# Patient Record
Sex: FEMALE | Race: WHITE | Employment: UNEMPLOYED | URBAN - METROPOLITAN AREA
[De-identification: names, ages, dates, MRNs, and addresses within clinical notes are randomized per-mention and may not be internally consistent; named-entity substitution may affect disease eponyms.]

---

## 2019-07-23 ENCOUNTER — HOSPITAL ENCOUNTER (OUTPATIENT)
Dept: ULTRASOUND IMAGING | Age: 34
Discharge: HOME | End: 2019-07-23
Payer: COMMERCIAL

## 2019-07-23 ENCOUNTER — HOSPITAL ENCOUNTER (OUTPATIENT)
Age: 34
Discharge: HOME | End: 2019-07-23
Payer: COMMERCIAL

## 2019-07-23 DIAGNOSIS — R10.2 PELVIC PAIN: ICD-10-CM

## 2019-07-23 DIAGNOSIS — R19.00 PELVIC MASS: ICD-10-CM

## 2019-07-23 PROCEDURE — 76830 TRANSVAGINAL US NON-OB: CPT

## 2019-07-23 PROCEDURE — 76856 US EXAM PELVIC COMPLETE: CPT

## 2019-08-05 ENCOUNTER — HOSPITAL ENCOUNTER (OUTPATIENT)
Age: 34
End: 2019-08-05
Payer: MEDICAID

## 2019-08-05 ENCOUNTER — HOSPITAL ENCOUNTER (OUTPATIENT)
Dept: CT IMAGING | Age: 34
Discharge: HOME | End: 2019-08-05
Payer: MEDICAID

## 2019-08-05 DIAGNOSIS — R19.07 GENERALIZED SWELLING, MASS, OR LUMP OF ABDOMEN OR PELVIS: ICD-10-CM

## 2019-08-05 PROCEDURE — 74177 CT ABD & PELVIS W/CONTRAST: CPT

## 2019-08-05 PROCEDURE — 6360000004 HC RX CONTRAST MEDICATION: Performed by: RADIOLOGY

## 2019-08-05 RX ADMIN — IOPAMIDOL 80 ML: 755 INJECTION, SOLUTION INTRAVENOUS at 09:51

## 2019-08-30 ENCOUNTER — HOSPITAL ENCOUNTER (OUTPATIENT)
Age: 34
Discharge: HOME | End: 2019-08-30
Payer: MEDICAID

## 2019-08-30 LAB
ALBUMIN SERPL-MCNC: 4.9 G/DL (ref 3.5–5.2)
ALP BLD-CCNC: 70 U/L (ref 35–104)
ALT SERPL-CCNC: 78 U/L (ref 0–32)
ANION GAP SERPL CALCULATED.3IONS-SCNC: 10 MMOL/L (ref 7–16)
AST SERPL-CCNC: 39 U/L (ref 0–31)
BILIRUB SERPL-MCNC: <0.2 MG/DL (ref 0–1.2)
BILIRUBIN DIRECT: <0.2 MG/DL (ref 0–0.3)
BILIRUBIN, INDIRECT: ABNORMAL MG/DL (ref 0–1)
BUN BLDV-MCNC: 12 MG/DL (ref 6–20)
CALCIUM SERPL-MCNC: 10 MG/DL (ref 8.6–10.2)
CHLORIDE BLD-SCNC: 103 MMOL/L (ref 98–107)
CO2: 27 MMOL/L (ref 22–29)
CREAT SERPL-MCNC: 0.6 MG/DL (ref 0.5–1)
GFR AFRICAN AMERICAN: >60
GFR NON-AFRICAN AMERICAN: >60 ML/MIN/1.73
GLUCOSE BLD-MCNC: 90 MG/DL (ref 74–99)
HCT VFR BLD CALC: 41.7 % (ref 34–48)
HEMOGLOBIN: 13.7 G/DL (ref 11.5–15.5)
MCH RBC QN AUTO: 29.2 PG (ref 26–35)
MCHC RBC AUTO-ENTMCNC: 32.9 % (ref 32–34.5)
MCV RBC AUTO: 88.9 FL (ref 80–99.9)
PDW BLD-RTO: 14.3 FL (ref 11.5–15)
PLATELET # BLD: 249 E9/L (ref 130–450)
PMV BLD AUTO: 10.7 FL (ref 7–12)
POTASSIUM SERPL-SCNC: 3.9 MMOL/L (ref 3.5–5)
RBC # BLD: 4.69 E12/L (ref 3.5–5.5)
SODIUM BLD-SCNC: 140 MMOL/L (ref 132–146)
TOTAL PROTEIN: 7.9 G/DL (ref 6.4–8.3)
WBC # BLD: 7.8 E9/L (ref 4.5–11.5)

## 2019-08-30 PROCEDURE — 80074 ACUTE HEPATITIS PANEL: CPT

## 2019-08-30 PROCEDURE — 80048 BASIC METABOLIC PNL TOTAL CA: CPT

## 2019-08-30 PROCEDURE — 80076 HEPATIC FUNCTION PANEL: CPT

## 2019-08-30 PROCEDURE — 81596 NFCT DS CHRNC HCV 6 ASSAYS: CPT

## 2019-08-30 PROCEDURE — 36415 COLL VENOUS BLD VENIPUNCTURE: CPT

## 2019-08-30 PROCEDURE — 87902 NFCT AGT GNTYP ALYS HEP C: CPT

## 2019-08-30 PROCEDURE — 82105 ALPHA-FETOPROTEIN SERUM: CPT

## 2019-08-30 PROCEDURE — 87522 HEPATITIS C REVRS TRNSCRPJ: CPT

## 2019-08-30 PROCEDURE — 85027 COMPLETE CBC AUTOMATED: CPT

## 2019-08-31 ENCOUNTER — HOSPITAL ENCOUNTER (OUTPATIENT)
Dept: ULTRASOUND IMAGING | Age: 34
Discharge: HOME | End: 2019-08-31
Payer: MEDICAID

## 2019-08-31 DIAGNOSIS — R10.11 ABDOMINAL PAIN, RIGHT UPPER QUADRANT: ICD-10-CM

## 2019-08-31 PROCEDURE — 76705 ECHO EXAM OF ABDOMEN: CPT

## 2019-09-02 LAB
AFP-TUMOR MARKER: 3 NG/ML (ref 0–9)
HCV QNT BY NAAT IU/ML: ABNORMAL IU/ML
HCV QNT BY NAAT LOG IU/ML: 6.14 LOG IU/ML
INTERPRETATION: DETECTED

## 2019-09-03 LAB
HAV IGM SER IA-ACNC: ABNORMAL
HEPATITIS B CORE IGM ANTIBODY: ABNORMAL
HEPATITIS B SURFACE ANTIGEN INTERPRETATION: ABNORMAL
HEPATITIS C ANTIBODY INTERPRETATION: REACTIVE

## 2019-09-05 LAB — HEPATITIS C GENOTYPE: NORMAL

## 2019-09-09 LAB
Lab: NORMAL
REPORT: NORMAL
THIS TEST SENT TO: NORMAL

## 2019-09-11 RX ORDER — SODIUM CHLORIDE 0.9 % (FLUSH) 0.9 %
10 SYRINGE (ML) INJECTION PRN
Status: CANCELLED | OUTPATIENT
Start: 2019-09-11

## 2019-09-11 RX ORDER — SODIUM CHLORIDE, SODIUM LACTATE, POTASSIUM CHLORIDE, CALCIUM CHLORIDE 600; 310; 30; 20 MG/100ML; MG/100ML; MG/100ML; MG/100ML
INJECTION, SOLUTION INTRAVENOUS CONTINUOUS
Status: CANCELLED | OUTPATIENT
Start: 2019-09-11

## 2019-09-11 RX ORDER — SODIUM CHLORIDE 0.9 % (FLUSH) 0.9 %
10 SYRINGE (ML) INJECTION EVERY 12 HOURS SCHEDULED
Status: CANCELLED | OUTPATIENT
Start: 2019-09-11

## 2019-09-12 ENCOUNTER — HOSPITAL ENCOUNTER (OUTPATIENT)
Dept: PREADMISSION TESTING | Age: 34
Discharge: HOME | End: 2019-09-12
Payer: MEDICAID

## 2019-09-12 VITALS
RESPIRATION RATE: 16 BRPM | OXYGEN SATURATION: 96 % | WEIGHT: 134.44 LBS | HEART RATE: 76 BPM | HEIGHT: 59 IN | BODY MASS INDEX: 27.1 KG/M2 | SYSTOLIC BLOOD PRESSURE: 105 MMHG | DIASTOLIC BLOOD PRESSURE: 61 MMHG | TEMPERATURE: 98.2 F

## 2019-09-12 DIAGNOSIS — Z01.818 PRE-OP TESTING: Primary | ICD-10-CM

## 2019-09-12 LAB
ABO/RH: NORMAL
ALBUMIN SERPL-MCNC: 4.7 G/DL (ref 3.5–5.2)
ALP BLD-CCNC: 72 U/L (ref 35–104)
ALT SERPL-CCNC: 107 U/L (ref 0–32)
ANION GAP SERPL CALCULATED.3IONS-SCNC: 11 MMOL/L (ref 7–16)
ANTIBODY SCREEN: NORMAL
AST SERPL-CCNC: 56 U/L (ref 0–31)
BILIRUB SERPL-MCNC: <0.2 MG/DL (ref 0–1.2)
BUN BLDV-MCNC: 9 MG/DL (ref 6–20)
CALCIUM SERPL-MCNC: 9.4 MG/DL (ref 8.6–10.2)
CHLORIDE BLD-SCNC: 99 MMOL/L (ref 98–107)
CO2: 28 MMOL/L (ref 22–29)
CREAT SERPL-MCNC: 0.5 MG/DL (ref 0.5–1)
GFR AFRICAN AMERICAN: >60
GFR NON-AFRICAN AMERICAN: >60 ML/MIN/1.73
GLUCOSE BLD-MCNC: 98 MG/DL (ref 74–99)
HCT VFR BLD CALC: 40.4 % (ref 34–48)
HEMOGLOBIN: 13.5 G/DL (ref 11.5–15.5)
MCH RBC QN AUTO: 29.5 PG (ref 26–35)
MCHC RBC AUTO-ENTMCNC: 33.4 % (ref 32–34.5)
MCV RBC AUTO: 88.2 FL (ref 80–99.9)
PDW BLD-RTO: 13.8 FL (ref 11.5–15)
PLATELET # BLD: 254 E9/L (ref 130–450)
PMV BLD AUTO: 10.6 FL (ref 7–12)
POTASSIUM REFLEX MAGNESIUM: 3.9 MMOL/L (ref 3.5–5)
RBC # BLD: 4.58 E12/L (ref 3.5–5.5)
SODIUM BLD-SCNC: 138 MMOL/L (ref 132–146)
TOTAL PROTEIN: 7.6 G/DL (ref 6.4–8.3)
WBC # BLD: 8.7 E9/L (ref 4.5–11.5)

## 2019-09-12 PROCEDURE — 36415 COLL VENOUS BLD VENIPUNCTURE: CPT

## 2019-09-12 PROCEDURE — 86900 BLOOD TYPING SEROLOGIC ABO: CPT

## 2019-09-12 PROCEDURE — 86901 BLOOD TYPING SEROLOGIC RH(D): CPT

## 2019-09-12 PROCEDURE — 86850 RBC ANTIBODY SCREEN: CPT

## 2019-09-12 PROCEDURE — 85027 COMPLETE CBC AUTOMATED: CPT

## 2019-09-12 PROCEDURE — 80053 COMPREHEN METABOLIC PANEL: CPT

## 2019-09-12 RX ORDER — ARIPIPRAZOLE 10 MG/1
10 TABLET ORAL DAILY
COMMUNITY
End: 2022-01-31 | Stop reason: ALTCHOICE

## 2019-09-12 ASSESSMENT — PAIN SCALES - GENERAL: PAINLEVEL_OUTOF10: 4

## 2019-09-12 ASSESSMENT — PAIN DESCRIPTION - LOCATION: LOCATION: ABDOMEN

## 2019-09-12 ASSESSMENT — PAIN DESCRIPTION - PAIN TYPE: TYPE: CHRONIC PAIN

## 2019-09-12 ASSESSMENT — PAIN DESCRIPTION - DESCRIPTORS: DESCRIPTORS: ACHING;SORE

## 2019-09-12 ASSESSMENT — PAIN DESCRIPTION - FREQUENCY: FREQUENCY: INTERMITTENT

## 2019-09-12 ASSESSMENT — PAIN DESCRIPTION - ORIENTATION: ORIENTATION: RIGHT;MID

## 2019-09-12 NOTE — PROGRESS NOTES
polish on your fingers or toes. 11. DO NOT wear any jewelry or piercings on day of surgery. All body piercing jewelry must be removed. 12. Shower the night before surgery with _x__Antibacterial soap     13. TOTAL JOINT REPLACEMENT/HYSTERECTOMY PATIENTS ONLY---Remember to bring Blood Bank bracelet to the hospital on the day of surgery. 14. If you have a Living Will and Durable Power of  for Healthcare, please bring in a copy. 15. If appropriate bring crutches, inspirex, WALKER, CANE etc... 12. Notify your Surgeon if you develop any illness between now and surgery time, cough, cold, fever, sore throat, nausea, vomiting, etc.  Please notify your surgeon if you experience dizziness, shortness of breath or blurred vision between now & the time of your surgery. 17. If you have ___dentures, they will be removed before going to the OR; we will provide you a container. If you wear ___contact lenses or ___glasses, they will be removed; please bring a case for them. 18. To provide excellent care visitors will be limited to 2 in the room at any given time. 19. Please bring picture ID and insurance card. 20. Sleep apnea patients need to bring CPAP AND SETTINGS to hospital on day of surgery. 21. During flu season no children under the age of 15 are permitted in the hospital for the safety of all patients. 22. Other Please check in at the main lobby/information desk in the front of the hospital.               Please call AMBULATORY CARE if you have any further questions.    [address] [facility]     [address]

## 2019-09-23 ENCOUNTER — ANESTHESIA EVENT (OUTPATIENT)
Dept: OPERATING ROOM | Age: 34
End: 2019-09-23
Payer: MEDICAID

## 2019-09-23 ENCOUNTER — ANESTHESIA (OUTPATIENT)
Dept: OPERATING ROOM | Age: 34
End: 2019-09-23
Payer: MEDICAID

## 2019-09-23 ENCOUNTER — HOSPITAL ENCOUNTER (OUTPATIENT)
Age: 34
Setting detail: OBSERVATION
Discharge: HOME | End: 2019-09-24
Attending: OBSTETRICS & GYNECOLOGY | Admitting: OBSTETRICS & GYNECOLOGY
Payer: MEDICAID

## 2019-09-23 VITALS
SYSTOLIC BLOOD PRESSURE: 111 MMHG | TEMPERATURE: 97.7 F | OXYGEN SATURATION: 100 % | DIASTOLIC BLOOD PRESSURE: 54 MMHG | RESPIRATION RATE: 16 BRPM

## 2019-09-23 DIAGNOSIS — Z01.818 PREOP TESTING: Primary | ICD-10-CM

## 2019-09-23 PROBLEM — D25.9 FIBROID UTERUS: Status: ACTIVE | Noted: 2019-09-23

## 2019-09-23 LAB
AMPHETAMINE SCREEN, URINE: NOT DETECTED
BARBITURATE SCREEN URINE: NOT DETECTED
BENZODIAZEPINE SCREEN, URINE: NOT DETECTED
CANNABINOID SCREEN URINE: NOT DETECTED
COCAINE METABOLITE SCREEN URINE: NOT DETECTED
HCG(URINE) PREGNANCY TEST: NEGATIVE
Lab: NORMAL
METHADONE SCREEN, URINE: NOT DETECTED
OPIATE SCREEN URINE: NOT DETECTED
PHENCYCLIDINE SCREEN URINE: NOT DETECTED
PROPOXYPHENE SCREEN: NOT DETECTED

## 2019-09-23 PROCEDURE — 3600000009 HC SURGERY ROBOT BASE: Performed by: OBSTETRICS & GYNECOLOGY

## 2019-09-23 PROCEDURE — 7100000000 HC PACU RECOVERY - FIRST 15 MIN: Performed by: OBSTETRICS & GYNECOLOGY

## 2019-09-23 PROCEDURE — 3700000000 HC ANESTHESIA ATTENDED CARE: Performed by: OBSTETRICS & GYNECOLOGY

## 2019-09-23 PROCEDURE — 2500000003 HC RX 250 WO HCPCS: Performed by: ANESTHESIOLOGY

## 2019-09-23 PROCEDURE — 3700000001 HC ADD 15 MINUTES (ANESTHESIA): Performed by: OBSTETRICS & GYNECOLOGY

## 2019-09-23 PROCEDURE — 88307 TISSUE EXAM BY PATHOLOGIST: CPT

## 2019-09-23 PROCEDURE — 6360000002 HC RX W HCPCS

## 2019-09-23 PROCEDURE — G0378 HOSPITAL OBSERVATION PER HR: HCPCS

## 2019-09-23 PROCEDURE — C1760 CLOSURE DEV, VASC: HCPCS | Performed by: OBSTETRICS & GYNECOLOGY

## 2019-09-23 PROCEDURE — 3600000019 HC SURGERY ROBOT ADDTL 15MIN: Performed by: OBSTETRICS & GYNECOLOGY

## 2019-09-23 PROCEDURE — 2580000003 HC RX 258: Performed by: OBSTETRICS & GYNECOLOGY

## 2019-09-23 PROCEDURE — 6370000000 HC RX 637 (ALT 250 FOR IP): Performed by: OBSTETRICS & GYNECOLOGY

## 2019-09-23 PROCEDURE — 58542 LSH W/T/O UT 250 G OR LESS: CPT | Performed by: OBSTETRICS & GYNECOLOGY

## 2019-09-23 PROCEDURE — 2709999900 HC NON-CHARGEABLE SUPPLY: Performed by: OBSTETRICS & GYNECOLOGY

## 2019-09-23 PROCEDURE — 6360000002 HC RX W HCPCS: Performed by: ANESTHESIOLOGY

## 2019-09-23 PROCEDURE — 2500000003 HC RX 250 WO HCPCS

## 2019-09-23 PROCEDURE — 2580000003 HC RX 258

## 2019-09-23 PROCEDURE — 80307 DRUG TEST PRSMV CHEM ANLYZR: CPT

## 2019-09-23 PROCEDURE — 6360000002 HC RX W HCPCS: Performed by: OBSTETRICS & GYNECOLOGY

## 2019-09-23 PROCEDURE — S2900 ROBOTIC SURGICAL SYSTEM: HCPCS | Performed by: OBSTETRICS & GYNECOLOGY

## 2019-09-23 PROCEDURE — 7100000001 HC PACU RECOVERY - ADDTL 15 MIN: Performed by: OBSTETRICS & GYNECOLOGY

## 2019-09-23 PROCEDURE — 2580000003 HC RX 258: Performed by: ANESTHESIOLOGY

## 2019-09-23 PROCEDURE — 81025 URINE PREGNANCY TEST: CPT

## 2019-09-23 RX ORDER — SODIUM CHLORIDE, SODIUM LACTATE, POTASSIUM CHLORIDE, CALCIUM CHLORIDE 600; 310; 30; 20 MG/100ML; MG/100ML; MG/100ML; MG/100ML
INJECTION, SOLUTION INTRAVENOUS CONTINUOUS PRN
Status: DISCONTINUED | OUTPATIENT
Start: 2019-09-23 | End: 2019-09-23 | Stop reason: SDUPTHER

## 2019-09-23 RX ORDER — CEFOXITIN SODIUM 2 G/50ML
2 INJECTION, SOLUTION INTRAVENOUS EVERY 6 HOURS SCHEDULED
Status: DISCONTINUED | OUTPATIENT
Start: 2019-09-23 | End: 2019-09-23

## 2019-09-23 RX ORDER — ACETAMINOPHEN 325 MG/1
650 TABLET ORAL EVERY 4 HOURS PRN
Status: DISCONTINUED | OUTPATIENT
Start: 2019-09-23 | End: 2019-09-24 | Stop reason: HOSPADM

## 2019-09-23 RX ORDER — SUCRALFATE 1 G/1
1 TABLET ORAL EVERY 12 HOURS SCHEDULED
Status: DISCONTINUED | OUTPATIENT
Start: 2019-09-23 | End: 2019-09-24 | Stop reason: HOSPADM

## 2019-09-23 RX ORDER — SODIUM CHLORIDE 0.9 % (FLUSH) 0.9 %
10 SYRINGE (ML) INJECTION EVERY 12 HOURS SCHEDULED
Status: DISCONTINUED | OUTPATIENT
Start: 2019-09-23 | End: 2019-09-23 | Stop reason: HOSPADM

## 2019-09-23 RX ORDER — OXYCODONE HYDROCHLORIDE AND ACETAMINOPHEN 5; 325 MG/1; MG/1
1 TABLET ORAL EVERY 4 HOURS PRN
Status: DISCONTINUED | OUTPATIENT
Start: 2019-09-23 | End: 2019-09-24 | Stop reason: HOSPADM

## 2019-09-23 RX ORDER — MEPERIDINE HYDROCHLORIDE 50 MG/ML
12.5 INJECTION INTRAMUSCULAR; INTRAVENOUS; SUBCUTANEOUS EVERY 5 MIN PRN
Status: DISCONTINUED | OUTPATIENT
Start: 2019-09-23 | End: 2019-09-23 | Stop reason: HOSPADM

## 2019-09-23 RX ORDER — KETOROLAC TROMETHAMINE 30 MG/ML
INJECTION, SOLUTION INTRAMUSCULAR; INTRAVENOUS PRN
Status: DISCONTINUED | OUTPATIENT
Start: 2019-09-23 | End: 2019-09-23 | Stop reason: SDUPTHER

## 2019-09-23 RX ORDER — OXYCODONE HYDROCHLORIDE AND ACETAMINOPHEN 5; 325 MG/1; MG/1
2 TABLET ORAL EVERY 4 HOURS PRN
Status: DISCONTINUED | OUTPATIENT
Start: 2019-09-23 | End: 2019-09-24 | Stop reason: HOSPADM

## 2019-09-23 RX ORDER — GLYCOPYRROLATE 1 MG/5 ML
SYRINGE (ML) INTRAVENOUS PRN
Status: DISCONTINUED | OUTPATIENT
Start: 2019-09-23 | End: 2019-09-23 | Stop reason: SDUPTHER

## 2019-09-23 RX ORDER — SODIUM CHLORIDE 0.9 % (FLUSH) 0.9 %
10 SYRINGE (ML) INJECTION PRN
Status: DISCONTINUED | OUTPATIENT
Start: 2019-09-23 | End: 2019-09-24 | Stop reason: HOSPADM

## 2019-09-23 RX ORDER — MEPERIDINE HYDROCHLORIDE 25 MG/ML
INJECTION INTRAMUSCULAR; INTRAVENOUS; SUBCUTANEOUS
Status: DISPENSED
Start: 2019-09-23 | End: 2019-09-24

## 2019-09-23 RX ORDER — ARIPIPRAZOLE 10 MG/1
10 TABLET ORAL DAILY
Status: DISCONTINUED | OUTPATIENT
Start: 2019-09-24 | End: 2019-09-24 | Stop reason: HOSPADM

## 2019-09-23 RX ORDER — SODIUM CHLORIDE, SODIUM LACTATE, POTASSIUM CHLORIDE, CALCIUM CHLORIDE 600; 310; 30; 20 MG/100ML; MG/100ML; MG/100ML; MG/100ML
INJECTION, SOLUTION INTRAVENOUS CONTINUOUS
Status: DISCONTINUED | OUTPATIENT
Start: 2019-09-23 | End: 2019-09-23

## 2019-09-23 RX ORDER — LIDOCAINE HYDROCHLORIDE 20 MG/ML
INJECTION, SOLUTION INTRAVENOUS PRN
Status: DISCONTINUED | OUTPATIENT
Start: 2019-09-23 | End: 2019-09-23 | Stop reason: SDUPTHER

## 2019-09-23 RX ORDER — PROMETHAZINE HYDROCHLORIDE 25 MG/ML
6.25 INJECTION, SOLUTION INTRAMUSCULAR; INTRAVENOUS
Status: DISCONTINUED | OUTPATIENT
Start: 2019-09-23 | End: 2019-09-23 | Stop reason: HOSPADM

## 2019-09-23 RX ORDER — PROPOFOL 10 MG/ML
INJECTION, EMULSION INTRAVENOUS PRN
Status: DISCONTINUED | OUTPATIENT
Start: 2019-09-23 | End: 2019-09-23 | Stop reason: SDUPTHER

## 2019-09-23 RX ORDER — HYDROMORPHONE HYDROCHLORIDE 1 MG/ML
0.25 INJECTION, SOLUTION INTRAMUSCULAR; INTRAVENOUS; SUBCUTANEOUS EVERY 5 MIN PRN
Status: DISCONTINUED | OUTPATIENT
Start: 2019-09-23 | End: 2019-09-23 | Stop reason: HOSPADM

## 2019-09-23 RX ORDER — HYDROMORPHONE HCL 110MG/55ML
PATIENT CONTROLLED ANALGESIA SYRINGE INTRAVENOUS
Status: DISCONTINUED
Start: 2019-09-23 | End: 2019-09-23

## 2019-09-23 RX ORDER — SODIUM CHLORIDE 0.9 % (FLUSH) 0.9 %
10 SYRINGE (ML) INJECTION EVERY 12 HOURS SCHEDULED
Status: DISCONTINUED | OUTPATIENT
Start: 2019-09-23 | End: 2019-09-24 | Stop reason: HOSPADM

## 2019-09-23 RX ORDER — HYDROMORPHONE HYDROCHLORIDE 1 MG/ML
0.5 INJECTION, SOLUTION INTRAMUSCULAR; INTRAVENOUS; SUBCUTANEOUS EVERY 5 MIN PRN
Status: DISCONTINUED | OUTPATIENT
Start: 2019-09-23 | End: 2019-09-23 | Stop reason: HOSPADM

## 2019-09-23 RX ORDER — DEXAMETHASONE SODIUM PHOSPHATE 10 MG/ML
INJECTION, SOLUTION INTRAMUSCULAR; INTRAVENOUS PRN
Status: DISCONTINUED | OUTPATIENT
Start: 2019-09-23 | End: 2019-09-23 | Stop reason: SDUPTHER

## 2019-09-23 RX ORDER — MORPHINE SULFATE 4 MG/ML
4 INJECTION, SOLUTION INTRAMUSCULAR; INTRAVENOUS
Status: DISCONTINUED | OUTPATIENT
Start: 2019-09-23 | End: 2019-09-24 | Stop reason: HOSPADM

## 2019-09-23 RX ORDER — NEOSTIGMINE METHYLSULFATE 1 MG/ML
INJECTION, SOLUTION INTRAVENOUS PRN
Status: DISCONTINUED | OUTPATIENT
Start: 2019-09-23 | End: 2019-09-23 | Stop reason: SDUPTHER

## 2019-09-23 RX ORDER — KETOROLAC TROMETHAMINE 30 MG/ML
30 INJECTION, SOLUTION INTRAMUSCULAR; INTRAVENOUS EVERY 6 HOURS
Status: COMPLETED | OUTPATIENT
Start: 2019-09-23 | End: 2019-09-24

## 2019-09-23 RX ORDER — FENTANYL CITRATE 50 UG/ML
INJECTION, SOLUTION INTRAMUSCULAR; INTRAVENOUS PRN
Status: DISCONTINUED | OUTPATIENT
Start: 2019-09-23 | End: 2019-09-23 | Stop reason: SDUPTHER

## 2019-09-23 RX ORDER — DEXTROSE, SODIUM CHLORIDE, SODIUM LACTATE, POTASSIUM CHLORIDE, AND CALCIUM CHLORIDE 5; .6; .31; .03; .02 G/100ML; G/100ML; G/100ML; G/100ML; G/100ML
INJECTION, SOLUTION INTRAVENOUS CONTINUOUS
Status: DISCONTINUED | OUTPATIENT
Start: 2019-09-23 | End: 2019-09-24 | Stop reason: HOSPADM

## 2019-09-23 RX ORDER — ONDANSETRON 2 MG/ML
4 INJECTION INTRAMUSCULAR; INTRAVENOUS EVERY 8 HOURS PRN
Status: DISCONTINUED | OUTPATIENT
Start: 2019-09-23 | End: 2019-09-24 | Stop reason: HOSPADM

## 2019-09-23 RX ORDER — MIDAZOLAM HYDROCHLORIDE 1 MG/ML
INJECTION INTRAMUSCULAR; INTRAVENOUS PRN
Status: DISCONTINUED | OUTPATIENT
Start: 2019-09-23 | End: 2019-09-23 | Stop reason: SDUPTHER

## 2019-09-23 RX ORDER — LABETALOL 20 MG/4 ML (5 MG/ML) INTRAVENOUS SYRINGE
5 EVERY 10 MIN PRN
Status: DISCONTINUED | OUTPATIENT
Start: 2019-09-23 | End: 2019-09-23 | Stop reason: HOSPADM

## 2019-09-23 RX ORDER — MORPHINE SULFATE 2 MG/ML
2 INJECTION, SOLUTION INTRAMUSCULAR; INTRAVENOUS
Status: DISCONTINUED | OUTPATIENT
Start: 2019-09-23 | End: 2019-09-24 | Stop reason: HOSPADM

## 2019-09-23 RX ORDER — ONDANSETRON 2 MG/ML
INJECTION INTRAMUSCULAR; INTRAVENOUS PRN
Status: DISCONTINUED | OUTPATIENT
Start: 2019-09-23 | End: 2019-09-23 | Stop reason: SDUPTHER

## 2019-09-23 RX ORDER — ROCURONIUM BROMIDE 10 MG/ML
INJECTION, SOLUTION INTRAVENOUS PRN
Status: DISCONTINUED | OUTPATIENT
Start: 2019-09-23 | End: 2019-09-23 | Stop reason: SDUPTHER

## 2019-09-23 RX ORDER — HYDRALAZINE HYDROCHLORIDE 20 MG/ML
5 INJECTION INTRAMUSCULAR; INTRAVENOUS EVERY 10 MIN PRN
Status: DISCONTINUED | OUTPATIENT
Start: 2019-09-23 | End: 2019-09-23 | Stop reason: HOSPADM

## 2019-09-23 RX ORDER — SODIUM CHLORIDE 0.9 % (FLUSH) 0.9 %
10 SYRINGE (ML) INJECTION PRN
Status: DISCONTINUED | OUTPATIENT
Start: 2019-09-23 | End: 2019-09-23 | Stop reason: HOSPADM

## 2019-09-23 RX ADMIN — FENTANYL CITRATE 150 MCG: 50 INJECTION, SOLUTION INTRAMUSCULAR; INTRAVENOUS at 07:46

## 2019-09-23 RX ADMIN — OXYCODONE HYDROCHLORIDE AND ACETAMINOPHEN 2 TABLET: 5; 325 TABLET ORAL at 23:04

## 2019-09-23 RX ADMIN — Medication 3 MG: at 12:19

## 2019-09-23 RX ADMIN — Medication 10 MG: at 08:57

## 2019-09-23 RX ADMIN — HYDROMORPHONE HYDROCHLORIDE 1 MG: 1 INJECTION, SOLUTION INTRAMUSCULAR; INTRAVENOUS; SUBCUTANEOUS at 12:43

## 2019-09-23 RX ADMIN — KETOROLAC TROMETHAMINE 30 MG: 30 INJECTION, SOLUTION INTRAMUSCULAR at 21:38

## 2019-09-23 RX ADMIN — SODIUM CHLORIDE, SODIUM LACTATE, POTASSIUM CHLORIDE, CALCIUM CHLORIDE AND DEXTROSE MONOHYDRATE: 5; 600; 310; 30; 20 INJECTION, SOLUTION INTRAVENOUS at 15:52

## 2019-09-23 RX ADMIN — SODIUM CHLORIDE, POTASSIUM CHLORIDE, SODIUM LACTATE AND CALCIUM CHLORIDE: 600; 310; 30; 20 INJECTION, SOLUTION INTRAVENOUS at 10:42

## 2019-09-23 RX ADMIN — HYDROMORPHONE HYDROCHLORIDE 0.5 MG: 1 INJECTION, SOLUTION INTRAMUSCULAR; INTRAVENOUS; SUBCUTANEOUS at 13:45

## 2019-09-23 RX ADMIN — MIDAZOLAM 2 MG: 1 INJECTION INTRAMUSCULAR; INTRAVENOUS at 07:41

## 2019-09-23 RX ADMIN — SODIUM CHLORIDE, POTASSIUM CHLORIDE, SODIUM LACTATE AND CALCIUM CHLORIDE: 600; 310; 30; 20 INJECTION, SOLUTION INTRAVENOUS at 12:16

## 2019-09-23 RX ADMIN — CEFOXITIN SODIUM 2 G: 2 POWDER, FOR SOLUTION INTRAVENOUS at 09:46

## 2019-09-23 RX ADMIN — FENTANYL CITRATE 50 MCG: 50 INJECTION, SOLUTION INTRAMUSCULAR; INTRAVENOUS at 11:32

## 2019-09-23 RX ADMIN — FENTANYL CITRATE 50 MCG: 50 INJECTION, SOLUTION INTRAMUSCULAR; INTRAVENOUS at 08:14

## 2019-09-23 RX ADMIN — MORPHINE SULFATE 4 MG: 4 INJECTION INTRAVENOUS at 17:17

## 2019-09-23 RX ADMIN — HYDROMORPHONE HYDROCHLORIDE 1 MG: 1 INJECTION, SOLUTION INTRAMUSCULAR; INTRAVENOUS; SUBCUTANEOUS at 12:44

## 2019-09-23 RX ADMIN — Medication 10 MG: at 10:02

## 2019-09-23 RX ADMIN — FENTANYL CITRATE 50 MCG: 50 INJECTION, SOLUTION INTRAMUSCULAR; INTRAVENOUS at 12:14

## 2019-09-23 RX ADMIN — FENTANYL CITRATE 50 MCG: 50 INJECTION, SOLUTION INTRAMUSCULAR; INTRAVENOUS at 09:12

## 2019-09-23 RX ADMIN — LIDOCAINE HYDROCHLORIDE 100 MG: 20 INJECTION, SOLUTION INTRAVENOUS at 07:46

## 2019-09-23 RX ADMIN — KETOROLAC TROMETHAMINE 30 MG: 30 INJECTION, SOLUTION INTRAMUSCULAR at 15:49

## 2019-09-23 RX ADMIN — CEFOXITIN SODIUM 2 G: 2 POWDER, FOR SOLUTION INTRAVENOUS at 07:46

## 2019-09-23 RX ADMIN — PROPOFOL 150 MG: 10 INJECTION, EMULSION INTRAVENOUS at 07:46

## 2019-09-23 RX ADMIN — SODIUM CHLORIDE, POTASSIUM CHLORIDE, SODIUM LACTATE AND CALCIUM CHLORIDE: 600; 310; 30; 20 INJECTION, SOLUTION INTRAVENOUS at 07:41

## 2019-09-23 RX ADMIN — Medication 10 MG: at 09:34

## 2019-09-23 RX ADMIN — Medication 0.4 MG: at 12:19

## 2019-09-23 RX ADMIN — ONDANSETRON HYDROCHLORIDE 4 MG: 2 INJECTION, SOLUTION INTRAMUSCULAR; INTRAVENOUS at 09:58

## 2019-09-23 RX ADMIN — Medication 50 MG: at 07:46

## 2019-09-23 RX ADMIN — SUCRALFATE 1 G: 1 TABLET ORAL at 20:31

## 2019-09-23 RX ADMIN — MORPHINE SULFATE 2 MG: 2 INJECTION, SOLUTION INTRAMUSCULAR; INTRAVENOUS at 19:45

## 2019-09-23 RX ADMIN — MEPERIDINE HYDROCHLORIDE 12.5 MG: 50 INJECTION, SOLUTION INTRAMUSCULAR; INTRAVENOUS; SUBCUTANEOUS at 12:18

## 2019-09-23 RX ADMIN — MEPERIDINE HYDROCHLORIDE 12.5 MG: 50 INJECTION, SOLUTION INTRAMUSCULAR; INTRAVENOUS; SUBCUTANEOUS at 13:23

## 2019-09-23 RX ADMIN — Medication 0.2 MG: at 08:08

## 2019-09-23 RX ADMIN — KETOROLAC TROMETHAMINE 30 MG: 30 INJECTION, SOLUTION INTRAMUSCULAR; INTRAVENOUS at 11:32

## 2019-09-23 RX ADMIN — Medication 10 MG: at 11:04

## 2019-09-23 RX ADMIN — DEXAMETHASONE SODIUM PHOSPHATE 10 MG: 10 INJECTION, SOLUTION INTRAMUSCULAR; INTRAVENOUS at 07:46

## 2019-09-23 RX ADMIN — Medication 10 ML: at 06:55

## 2019-09-23 RX ADMIN — SODIUM CHLORIDE, POTASSIUM CHLORIDE, SODIUM LACTATE AND CALCIUM CHLORIDE: 600; 310; 30; 20 INJECTION, SOLUTION INTRAVENOUS at 06:34

## 2019-09-23 ASSESSMENT — PULMONARY FUNCTION TESTS
PIF_VALUE: 39
PIF_VALUE: 22
PIF_VALUE: 36
PIF_VALUE: 2
PIF_VALUE: 39
PIF_VALUE: 39
PIF_VALUE: 20
PIF_VALUE: 39
PIF_VALUE: 2
PIF_VALUE: 39
PIF_VALUE: 20
PIF_VALUE: 39
PIF_VALUE: 39
PIF_VALUE: 20
PIF_VALUE: 22
PIF_VALUE: 3
PIF_VALUE: 39
PIF_VALUE: 21
PIF_VALUE: 39
PIF_VALUE: 20
PIF_VALUE: 3
PIF_VALUE: 39
PIF_VALUE: 2
PIF_VALUE: 40
PIF_VALUE: 21
PIF_VALUE: 3
PIF_VALUE: 39
PIF_VALUE: 3
PIF_VALUE: 2
PIF_VALUE: 39
PIF_VALUE: 35
PIF_VALUE: 34
PIF_VALUE: 3
PIF_VALUE: 39
PIF_VALUE: 1
PIF_VALUE: 39
PIF_VALUE: 39
PIF_VALUE: 3
PIF_VALUE: 3
PIF_VALUE: 39
PIF_VALUE: 2
PIF_VALUE: 21
PIF_VALUE: 27
PIF_VALUE: 36
PIF_VALUE: 39
PIF_VALUE: 40
PIF_VALUE: 39
PIF_VALUE: 20
PIF_VALUE: 39
PIF_VALUE: 3
PIF_VALUE: 39
PIF_VALUE: 2
PIF_VALUE: 36
PIF_VALUE: 3
PIF_VALUE: 3
PIF_VALUE: 1
PIF_VALUE: 13
PIF_VALUE: 39
PIF_VALUE: 39
PIF_VALUE: 0
PIF_VALUE: 39
PIF_VALUE: 3
PIF_VALUE: 34
PIF_VALUE: 39
PIF_VALUE: 19
PIF_VALUE: 39
PIF_VALUE: 19
PIF_VALUE: 22
PIF_VALUE: 39
PIF_VALUE: 39
PIF_VALUE: 2
PIF_VALUE: 39
PIF_VALUE: 39
PIF_VALUE: 2
PIF_VALUE: 20
PIF_VALUE: 39
PIF_VALUE: 39
PIF_VALUE: 3
PIF_VALUE: 3
PIF_VALUE: 2
PIF_VALUE: 20
PIF_VALUE: 39
PIF_VALUE: 10
PIF_VALUE: 39
PIF_VALUE: 42
PIF_VALUE: 39
PIF_VALUE: 39
PIF_VALUE: 38
PIF_VALUE: 39
PIF_VALUE: 40
PIF_VALUE: 19
PIF_VALUE: 39
PIF_VALUE: 21
PIF_VALUE: 39
PIF_VALUE: 6
PIF_VALUE: 39
PIF_VALUE: 4
PIF_VALUE: 39
PIF_VALUE: 35
PIF_VALUE: 38
PIF_VALUE: 3
PIF_VALUE: 13
PIF_VALUE: 39
PIF_VALUE: 2
PIF_VALUE: 39
PIF_VALUE: 23
PIF_VALUE: 39
PIF_VALUE: 39
PIF_VALUE: 13
PIF_VALUE: 36
PIF_VALUE: 39
PIF_VALUE: 2
PIF_VALUE: 39
PIF_VALUE: 1
PIF_VALUE: 40
PIF_VALUE: 40
PIF_VALUE: 21
PIF_VALUE: 16
PIF_VALUE: 20
PIF_VALUE: 36
PIF_VALUE: 2
PIF_VALUE: 39
PIF_VALUE: 39
PIF_VALUE: 20
PIF_VALUE: 2
PIF_VALUE: 20
PIF_VALUE: 39
PIF_VALUE: 36
PIF_VALUE: 39
PIF_VALUE: 39
PIF_VALUE: 21
PIF_VALUE: 3
PIF_VALUE: 39
PIF_VALUE: 39
PIF_VALUE: 2
PIF_VALUE: 36
PIF_VALUE: 39
PIF_VALUE: 39
PIF_VALUE: 2
PIF_VALUE: 39
PIF_VALUE: 3
PIF_VALUE: 39
PIF_VALUE: 39
PIF_VALUE: 2
PIF_VALUE: 39
PIF_VALUE: 40
PIF_VALUE: 2
PIF_VALUE: 36
PIF_VALUE: 39
PIF_VALUE: 36
PIF_VALUE: 39
PIF_VALUE: 40
PIF_VALUE: 22
PIF_VALUE: 39
PIF_VALUE: 21
PIF_VALUE: 38
PIF_VALUE: 36
PIF_VALUE: 40
PIF_VALUE: 2
PIF_VALUE: 39
PIF_VALUE: 39
PIF_VALUE: 3
PIF_VALUE: 39
PIF_VALUE: 39
PIF_VALUE: 41
PIF_VALUE: 39
PIF_VALUE: 40
PIF_VALUE: 40
PIF_VALUE: 36
PIF_VALUE: 39
PIF_VALUE: 10
PIF_VALUE: 21
PIF_VALUE: 4
PIF_VALUE: 20
PIF_VALUE: 1
PIF_VALUE: 0
PIF_VALUE: 39
PIF_VALUE: 36
PIF_VALUE: 39
PIF_VALUE: 21
PIF_VALUE: 18
PIF_VALUE: 22
PIF_VALUE: 20
PIF_VALUE: 15
PIF_VALUE: 20
PIF_VALUE: 39
PIF_VALUE: 36
PIF_VALUE: 39
PIF_VALUE: 2
PIF_VALUE: 39
PIF_VALUE: 13
PIF_VALUE: 39
PIF_VALUE: 7
PIF_VALUE: 36
PIF_VALUE: 40
PIF_VALUE: 40
PIF_VALUE: 39
PIF_VALUE: 20
PIF_VALUE: 20
PIF_VALUE: 39
PIF_VALUE: 40
PIF_VALUE: 39
PIF_VALUE: 2
PIF_VALUE: 24
PIF_VALUE: 39
PIF_VALUE: 4
PIF_VALUE: 2
PIF_VALUE: 2
PIF_VALUE: 17
PIF_VALUE: 21
PIF_VALUE: 20
PIF_VALUE: 36
PIF_VALUE: 39
PIF_VALUE: 39
PIF_VALUE: 36
PIF_VALUE: 39
PIF_VALUE: 40
PIF_VALUE: 39
PIF_VALUE: 2
PIF_VALUE: 39
PIF_VALUE: 39
PIF_VALUE: 3
PIF_VALUE: 20
PIF_VALUE: 6
PIF_VALUE: 1
PIF_VALUE: 20
PIF_VALUE: 19
PIF_VALUE: 20
PIF_VALUE: 39
PIF_VALUE: 39
PIF_VALUE: 40
PIF_VALUE: 39
PIF_VALUE: 40
PIF_VALUE: 39
PIF_VALUE: 40
PIF_VALUE: 36
PIF_VALUE: 27
PIF_VALUE: 39
PIF_VALUE: 39
PIF_VALUE: 2
PIF_VALUE: 39
PIF_VALUE: 3
PIF_VALUE: 40
PIF_VALUE: 3
PIF_VALUE: 16
PIF_VALUE: 40
PIF_VALUE: 1
PIF_VALUE: 33
PIF_VALUE: 0
PIF_VALUE: 39
PIF_VALUE: 39
PIF_VALUE: 36

## 2019-09-23 ASSESSMENT — PAIN DESCRIPTION - LOCATION
LOCATION: ABDOMEN

## 2019-09-23 ASSESSMENT — PAIN SCALES - GENERAL
PAINLEVEL_OUTOF10: 2
PAINLEVEL_OUTOF10: 5
PAINLEVEL_OUTOF10: 10
PAINLEVEL_OUTOF10: 10
PAINLEVEL_OUTOF10: 7
PAINLEVEL_OUTOF10: 8
PAINLEVEL_OUTOF10: 10
PAINLEVEL_OUTOF10: 8
PAINLEVEL_OUTOF10: 3
PAINLEVEL_OUTOF10: 2
PAINLEVEL_OUTOF10: 3
PAINLEVEL_OUTOF10: 4
PAINLEVEL_OUTOF10: 6

## 2019-09-23 ASSESSMENT — PAIN DESCRIPTION - PROGRESSION
CLINICAL_PROGRESSION: NOT CHANGED
CLINICAL_PROGRESSION: GRADUALLY IMPROVING
CLINICAL_PROGRESSION: GRADUALLY IMPROVING

## 2019-09-23 ASSESSMENT — PAIN DESCRIPTION - DESCRIPTORS
DESCRIPTORS: ACHING;CONSTANT;DISCOMFORT
DESCRIPTORS: ACHING;DISCOMFORT
DESCRIPTORS: ACHING;DISCOMFORT
DESCRIPTORS: ACHING;CRAMPING;DISCOMFORT;TENDER
DESCRIPTORS: ACHING;CONSTANT;DISCOMFORT

## 2019-09-23 ASSESSMENT — PAIN DESCRIPTION - ONSET
ONSET: ON-GOING
ONSET: AWAKENED FROM SLEEP
ONSET: AWAKENED FROM SLEEP

## 2019-09-23 ASSESSMENT — PAIN DESCRIPTION - PAIN TYPE
TYPE: SURGICAL PAIN

## 2019-09-23 ASSESSMENT — PAIN DESCRIPTION - ORIENTATION
ORIENTATION: RIGHT;MID

## 2019-09-23 ASSESSMENT — PAIN - FUNCTIONAL ASSESSMENT
PAIN_FUNCTIONAL_ASSESSMENT: PREVENTS OR INTERFERES SOME ACTIVE ACTIVITIES AND ADLS
PAIN_FUNCTIONAL_ASSESSMENT: 0-10

## 2019-09-23 ASSESSMENT — PAIN DESCRIPTION - FREQUENCY
FREQUENCY: INTERMITTENT
FREQUENCY: INTERMITTENT

## 2019-09-23 ASSESSMENT — LIFESTYLE VARIABLES: SMOKING_STATUS: 1

## 2019-09-24 VITALS
SYSTOLIC BLOOD PRESSURE: 113 MMHG | OXYGEN SATURATION: 97 % | TEMPERATURE: 98.5 F | WEIGHT: 141 LBS | HEART RATE: 89 BPM | DIASTOLIC BLOOD PRESSURE: 58 MMHG | BODY MASS INDEX: 28.43 KG/M2 | HEIGHT: 59 IN | RESPIRATION RATE: 18 BRPM

## 2019-09-24 PROBLEM — D25.9 FIBROID UTERUS: Status: RESOLVED | Noted: 2019-09-23 | Resolved: 2019-09-24

## 2019-09-24 LAB
HCT VFR BLD CALC: 24.4 % (ref 34–48)
HEMOGLOBIN: 8.1 G/DL (ref 11.5–15.5)
MCH RBC QN AUTO: 29.6 PG (ref 26–35)
MCHC RBC AUTO-ENTMCNC: 33.2 % (ref 32–34.5)
MCV RBC AUTO: 89.1 FL (ref 80–99.9)
PDW BLD-RTO: 14.4 FL (ref 11.5–15)
PLATELET # BLD: 168 E9/L (ref 130–450)
PMV BLD AUTO: 11 FL (ref 7–12)
RBC # BLD: 2.74 E12/L (ref 3.5–5.5)
WBC # BLD: 8.5 E9/L (ref 4.5–11.5)

## 2019-09-24 PROCEDURE — 2580000003 HC RX 258: Performed by: OBSTETRICS & GYNECOLOGY

## 2019-09-24 PROCEDURE — 85027 COMPLETE CBC AUTOMATED: CPT

## 2019-09-24 PROCEDURE — 6370000000 HC RX 637 (ALT 250 FOR IP): Performed by: OBSTETRICS & GYNECOLOGY

## 2019-09-24 PROCEDURE — 96376 TX/PRO/DX INJ SAME DRUG ADON: CPT

## 2019-09-24 PROCEDURE — 94150 VITAL CAPACITY TEST: CPT

## 2019-09-24 PROCEDURE — 96375 TX/PRO/DX INJ NEW DRUG ADDON: CPT

## 2019-09-24 PROCEDURE — G0378 HOSPITAL OBSERVATION PER HR: HCPCS

## 2019-09-24 PROCEDURE — 36415 COLL VENOUS BLD VENIPUNCTURE: CPT

## 2019-09-24 PROCEDURE — 96374 THER/PROPH/DIAG INJ IV PUSH: CPT

## 2019-09-24 PROCEDURE — 6360000002 HC RX W HCPCS: Performed by: OBSTETRICS & GYNECOLOGY

## 2019-09-24 RX ORDER — KETOROLAC TROMETHAMINE 10 MG/1
10 TABLET, FILM COATED ORAL EVERY 6 HOURS PRN
Qty: 20 TABLET | Refills: 0 | OUTPATIENT
Start: 2019-09-24 | End: 2020-12-16

## 2019-09-24 RX ADMIN — OXYCODONE HYDROCHLORIDE AND ACETAMINOPHEN 2 TABLET: 5; 325 TABLET ORAL at 05:06

## 2019-09-24 RX ADMIN — OXYCODONE HYDROCHLORIDE AND ACETAMINOPHEN 2 TABLET: 5; 325 TABLET ORAL at 10:29

## 2019-09-24 RX ADMIN — SODIUM CHLORIDE, SODIUM LACTATE, POTASSIUM CHLORIDE, CALCIUM CHLORIDE AND DEXTROSE MONOHYDRATE: 5; 600; 310; 30; 20 INJECTION, SOLUTION INTRAVENOUS at 08:31

## 2019-09-24 RX ADMIN — SUCRALFATE 1 G: 1 TABLET ORAL at 08:12

## 2019-09-24 RX ADMIN — KETOROLAC TROMETHAMINE 30 MG: 30 INJECTION, SOLUTION INTRAMUSCULAR at 10:29

## 2019-09-24 RX ADMIN — ARIPIPRAZOLE 10 MG: 10 TABLET ORAL at 08:12

## 2019-09-24 RX ADMIN — KETOROLAC TROMETHAMINE 30 MG: 30 INJECTION, SOLUTION INTRAMUSCULAR at 04:05

## 2019-09-24 RX ADMIN — MORPHINE SULFATE 4 MG: 4 INJECTION INTRAVENOUS at 08:11

## 2019-09-24 ASSESSMENT — PAIN - FUNCTIONAL ASSESSMENT: PAIN_FUNCTIONAL_ASSESSMENT: PREVENTS OR INTERFERES SOME ACTIVE ACTIVITIES AND ADLS

## 2019-09-24 ASSESSMENT — PAIN DESCRIPTION - PROGRESSION
CLINICAL_PROGRESSION: NOT CHANGED
CLINICAL_PROGRESSION: GRADUALLY IMPROVING

## 2019-09-24 ASSESSMENT — PAIN DESCRIPTION - FREQUENCY: FREQUENCY: CONTINUOUS

## 2019-09-24 ASSESSMENT — PAIN SCALES - GENERAL
PAINLEVEL_OUTOF10: 9
PAINLEVEL_OUTOF10: 8
PAINLEVEL_OUTOF10: 8
PAINLEVEL_OUTOF10: 3
PAINLEVEL_OUTOF10: 6
PAINLEVEL_OUTOF10: 6

## 2019-09-24 ASSESSMENT — PAIN DESCRIPTION - ONSET: ONSET: ON-GOING

## 2019-09-24 ASSESSMENT — PAIN DESCRIPTION - ORIENTATION: ORIENTATION: RIGHT;LEFT;MID

## 2019-09-24 ASSESSMENT — PAIN DESCRIPTION - LOCATION: LOCATION: ABDOMEN

## 2019-09-24 ASSESSMENT — PAIN DESCRIPTION - DESCRIPTORS: DESCRIPTORS: ACHING;DISCOMFORT;SORE;THROBBING

## 2019-09-24 ASSESSMENT — PAIN DESCRIPTION - PAIN TYPE: TYPE: SURGICAL PAIN

## 2019-11-29 ENCOUNTER — HOSPITAL ENCOUNTER (OUTPATIENT)
Dept: GENERAL RADIOLOGY | Age: 34
Discharge: HOME | End: 2019-12-01
Payer: MEDICAID

## 2019-11-29 ENCOUNTER — HOSPITAL ENCOUNTER (OUTPATIENT)
Dept: CT IMAGING | Age: 34
Discharge: HOME | End: 2019-11-29
Payer: MEDICAID

## 2019-11-29 DIAGNOSIS — N39.498 OTHER URINARY INCONTINENCE: ICD-10-CM

## 2019-11-29 DIAGNOSIS — R32 URINARY INCONTINENCE, UNSPECIFIED TYPE: ICD-10-CM

## 2019-11-29 PROCEDURE — 74177 CT ABD & PELVIS W/CONTRAST: CPT

## 2019-11-29 PROCEDURE — 6360000004 HC RX CONTRAST MEDICATION: Performed by: OBSTETRICS & GYNECOLOGY

## 2019-11-29 PROCEDURE — 74430 CONTRAST X-RAY BLADDER: CPT

## 2019-11-29 PROCEDURE — 51600 INJECTION FOR BLADDER X-RAY: CPT

## 2019-11-29 PROCEDURE — 6360000004 HC RX CONTRAST MEDICATION: Performed by: RADIOLOGY

## 2019-11-29 RX ADMIN — IOPAMIDOL 80 ML: 755 INJECTION, SOLUTION INTRAVENOUS at 09:23

## 2019-11-29 RX ADMIN — IOTHALAMATE MEGLUMINE 250 ML: 172 INJECTION URETERAL at 08:59

## 2020-09-02 ENCOUNTER — APPOINTMENT (OUTPATIENT)
Dept: CT IMAGING | Age: 35
End: 2020-09-02
Payer: MEDICAID

## 2020-09-02 ENCOUNTER — HOSPITAL ENCOUNTER (EMERGENCY)
Age: 35
Discharge: HOME | End: 2020-09-02
Payer: MEDICAID

## 2020-09-02 VITALS
WEIGHT: 115 LBS | SYSTOLIC BLOOD PRESSURE: 123 MMHG | OXYGEN SATURATION: 100 % | BODY MASS INDEX: 23.23 KG/M2 | TEMPERATURE: 98.3 F | RESPIRATION RATE: 16 BRPM | DIASTOLIC BLOOD PRESSURE: 79 MMHG | HEART RATE: 62 BPM

## 2020-09-02 LAB
A/G RATIO: 1.7 (ref 1.1–2.2)
ALBUMIN SERPL-MCNC: 4.4 G/DL (ref 3.4–5)
ALP BLD-CCNC: 75 U/L (ref 40–129)
ALT SERPL-CCNC: 55 U/L (ref 10–40)
ANION GAP SERPL CALCULATED.3IONS-SCNC: 10 MMOL/L (ref 3–16)
AST SERPL-CCNC: 31 U/L (ref 15–37)
BACTERIA: ABNORMAL /HPF
BASOPHILS ABSOLUTE: 0 K/UL (ref 0–0.2)
BASOPHILS RELATIVE PERCENT: 0.6 %
BILIRUB SERPL-MCNC: <0.2 MG/DL (ref 0–1)
BILIRUBIN URINE: NEGATIVE
BLOOD, URINE: NEGATIVE
BUN BLDV-MCNC: 8 MG/DL (ref 7–20)
CALCIUM SERPL-MCNC: 9.2 MG/DL (ref 8.3–10.6)
CHLORIDE BLD-SCNC: 101 MMOL/L (ref 99–110)
CLARITY: CLEAR
CO2: 27 MMOL/L (ref 21–32)
COLOR: YELLOW
CREAT SERPL-MCNC: <0.5 MG/DL (ref 0.6–1.1)
EOSINOPHILS ABSOLUTE: 0.1 K/UL (ref 0–0.6)
EOSINOPHILS RELATIVE PERCENT: 1.9 %
EPITHELIAL CELLS, UA: ABNORMAL /HPF (ref 0–5)
GFR AFRICAN AMERICAN: >60
GFR NON-AFRICAN AMERICAN: >60
GLOBULIN: 2.6 G/DL
GLUCOSE BLD-MCNC: 86 MG/DL (ref 70–99)
GLUCOSE URINE: NEGATIVE MG/DL
HCG QUALITATIVE: NEGATIVE
HCT VFR BLD CALC: 38.9 % (ref 36–48)
HEMOGLOBIN: 13 G/DL (ref 12–16)
KETONES, URINE: NEGATIVE MG/DL
LEUKOCYTE ESTERASE, URINE: NEGATIVE
LIPASE: 18 U/L (ref 13–60)
LYMPHOCYTES ABSOLUTE: 2.6 K/UL (ref 1–5.1)
LYMPHOCYTES RELATIVE PERCENT: 37.4 %
MCH RBC QN AUTO: 29.3 PG (ref 26–34)
MCHC RBC AUTO-ENTMCNC: 33.4 G/DL (ref 31–36)
MCV RBC AUTO: 87.7 FL (ref 80–100)
MICROSCOPIC EXAMINATION: YES
MONOCYTES ABSOLUTE: 0.5 K/UL (ref 0–1.3)
MONOCYTES RELATIVE PERCENT: 7 %
NEUTROPHILS ABSOLUTE: 3.7 K/UL (ref 1.7–7.7)
NEUTROPHILS RELATIVE PERCENT: 53.1 %
NITRITE, URINE: POSITIVE
PDW BLD-RTO: 14.6 % (ref 12.4–15.4)
PH UA: 6 (ref 5–8)
PLATELET # BLD: 227 K/UL (ref 135–450)
PMV BLD AUTO: 8.5 FL (ref 5–10.5)
POTASSIUM SERPL-SCNC: 3.7 MMOL/L (ref 3.5–5.1)
PROTEIN UA: NEGATIVE MG/DL
RBC # BLD: 4.44 M/UL (ref 4–5.2)
RBC UA: ABNORMAL /HPF (ref 0–4)
SODIUM BLD-SCNC: 138 MMOL/L (ref 136–145)
SPECIFIC GRAVITY UA: 1.02 (ref 1–1.03)
TOTAL PROTEIN: 7 G/DL (ref 6.4–8.2)
URINE REFLEX TO CULTURE: ABNORMAL
URINE TYPE: ABNORMAL
UROBILINOGEN, URINE: 0.2 E.U./DL
WBC # BLD: 7 K/UL (ref 4–11)
WBC UA: ABNORMAL /HPF (ref 0–5)

## 2020-09-02 PROCEDURE — 96361 HYDRATE IV INFUSION ADD-ON: CPT

## 2020-09-02 PROCEDURE — 85025 COMPLETE CBC W/AUTO DIFF WBC: CPT

## 2020-09-02 PROCEDURE — 99284 EMERGENCY DEPT VISIT MOD MDM: CPT

## 2020-09-02 PROCEDURE — 2580000003 HC RX 258: Performed by: NURSE PRACTITIONER

## 2020-09-02 PROCEDURE — 6360000002 HC RX W HCPCS: Performed by: NURSE PRACTITIONER

## 2020-09-02 PROCEDURE — 81001 URINALYSIS AUTO W/SCOPE: CPT

## 2020-09-02 PROCEDURE — 96374 THER/PROPH/DIAG INJ IV PUSH: CPT

## 2020-09-02 PROCEDURE — 96375 TX/PRO/DX INJ NEW DRUG ADDON: CPT

## 2020-09-02 PROCEDURE — 96376 TX/PRO/DX INJ SAME DRUG ADON: CPT

## 2020-09-02 PROCEDURE — 84703 CHORIONIC GONADOTROPIN ASSAY: CPT

## 2020-09-02 PROCEDURE — 6360000004 HC RX CONTRAST MEDICATION: Performed by: NURSE PRACTITIONER

## 2020-09-02 PROCEDURE — 80053 COMPREHEN METABOLIC PANEL: CPT

## 2020-09-02 PROCEDURE — 83690 ASSAY OF LIPASE: CPT

## 2020-09-02 PROCEDURE — 74177 CT ABD & PELVIS W/CONTRAST: CPT

## 2020-09-02 RX ORDER — MORPHINE SULFATE 4 MG/ML
4 INJECTION, SOLUTION INTRAMUSCULAR; INTRAVENOUS ONCE
Status: COMPLETED | OUTPATIENT
Start: 2020-09-02 | End: 2020-09-02

## 2020-09-02 RX ORDER — ONDANSETRON 4 MG/1
4 TABLET, ORALLY DISINTEGRATING ORAL EVERY 8 HOURS PRN
Qty: 15 TABLET | Refills: 0 | Status: SHIPPED | OUTPATIENT
Start: 2020-09-02 | End: 2020-12-16

## 2020-09-02 RX ORDER — ONDANSETRON 2 MG/ML
4 INJECTION INTRAMUSCULAR; INTRAVENOUS ONCE
Status: COMPLETED | OUTPATIENT
Start: 2020-09-02 | End: 2020-09-02

## 2020-09-02 RX ORDER — BUPRENORPHINE HYDROCHLORIDE AND NALOXONE HYDROCHLORIDE DIHYDRATE 8; 2 MG/1; MG/1
8 TABLET SUBLINGUAL DAILY
COMMUNITY
End: 2022-01-31 | Stop reason: ALTCHOICE

## 2020-09-02 RX ORDER — DICYCLOMINE HYDROCHLORIDE 10 MG/1
20 CAPSULE ORAL EVERY 6 HOURS PRN
Qty: 20 CAPSULE | Refills: 0 | OUTPATIENT
Start: 2020-09-02 | End: 2020-12-16

## 2020-09-02 RX ORDER — 0.9 % SODIUM CHLORIDE 0.9 %
1000 INTRAVENOUS SOLUTION INTRAVENOUS ONCE
Status: COMPLETED | OUTPATIENT
Start: 2020-09-02 | End: 2020-09-02

## 2020-09-02 RX ADMIN — ONDANSETRON 4 MG: 2 INJECTION INTRAMUSCULAR; INTRAVENOUS at 15:24

## 2020-09-02 RX ADMIN — IOPAMIDOL 75 ML: 755 INJECTION, SOLUTION INTRAVENOUS at 16:13

## 2020-09-02 RX ADMIN — SODIUM CHLORIDE 1000 ML: 9 INJECTION, SOLUTION INTRAVENOUS at 15:18

## 2020-09-02 RX ADMIN — MORPHINE SULFATE 4 MG: 4 INJECTION, SOLUTION INTRAMUSCULAR; INTRAVENOUS at 17:02

## 2020-09-02 RX ADMIN — MORPHINE SULFATE 4 MG: 4 INJECTION, SOLUTION INTRAMUSCULAR; INTRAVENOUS at 15:24

## 2020-09-02 ASSESSMENT — PAIN SCALES - GENERAL
PAINLEVEL_OUTOF10: 10
PAINLEVEL_OUTOF10: 7
PAINLEVEL_OUTOF10: 9
PAINLEVEL_OUTOF10: 10

## 2020-09-02 NOTE — ED NOTES
Pt did not drive herself here as stated earlier pt states \"I got dropped off\" pt called [employer] to  in approx 20 min.      Jennifer Hewitt, BRITTNY  46/34/96 [id number]

## 2020-09-02 NOTE — ED NOTES
Abdominal pain. Pt states \"I have been hurting for the past x5 days it hurts in the RUQ/feel nauseated but no vomiting/when I use the restroom my stool is hard/the past couple of days/the color of stool is dark/no burning with urination/but it hurts afterward/I did have a hysterectomy back last October/I do leak urine all the time\". Pt tender to palpation in RUQ/abdominal bowel sounds x4 quadrants.      Heavenly Ely, BRITTNY  34/87/83 [id number]

## 2020-09-02 NOTE — ED NOTES
Pt was asking for [employer] for home. I made pt aware that we do not give out vouchers through the ED anymore. Pt states \"well my dad was just released today and they gave him a voucher\". I ask if it was through the ED or from being d/c home from the hospital\". the pt could not give me an answer. I made pt aware prior to receiving any meds for pain she had to find a way home. Pt states \"well I drove my self here, but I can find a ride home\".      Glen Mendoza LPN  37/40/76 [id number]

## 2020-09-02 NOTE — ED PROVIDER NOTES
CHIEF COMPLAINT  Abdominal Pain (X 4 DAYS, RIGHT LOWER QUADRANT, CONSTIPATED)      HISTORY OF PRESENT ILLNESS  Rita Villalobos is a 38 y.o. female who presents to the ED complaining of abdominal pain. Patient is nontoxic in appearance and in moderate distress, moaning and grimacing. Patient presents to the emergency department via private vehicle. Patient reports that for the last 4 days she has been experiencing right lower quadrant abdominal pain with associated nausea. Patient denies emesis. Denies diarrhea. States that she has felt constipated. Patient denies headache or visual disturbances, lightheadedness or dizziness. No chest pain, shortness breath or difficulty breathing. Denies any bloody or bilious emesis or stools. States that she does have urinary frequency and \"leaks urine\" after hysterectomy in October of last year. Patient denies any abnormal vaginal discharge or irritation. No fever or chills. Reports discomfort is a 9 out of 10 and constant. Denies exacerbating or relieving factors or radiation of pain. No other complaints, modifying factors or associated symptoms. Nursing notes reviewed. Past Medical History:   Diagnosis Date    Hepatitis C     Other disorders of kidney and ureter      Past Surgical History:   Procedure Laterality Date    CYSTOSCOPY      stents    ENDOSCOPY, COLON, DIAGNOSTIC      HYSTERECTOMY N/A 9/23/2019    HYSTERECTOMY ABDOMINAL LAPAROSCOPIC ROBOTIC XI BILATERAL SALPINGECTOMY WITH CYSTOSCOPY performed by Karli Patel MD at [place] [address]       History reviewed. No pertinent family history.   Social History     Socioeconomic History    Marital status: Single     Spouse name: Not on file    Number of children: Not on file    Years of education: Not on file    Highest education level: Not on file   Occupational History    Not on file   Social Needs    Financial resource strain: Not on file    Food insecurity     Worry: Not on file     Inability: Not on file    Transportation needs     Medical: Not on file     Non-medical: Not on file   Tobacco Use    Smoking status: Current Every Day Smoker     Packs/day: 0.50     Types: Cigarettes    Smokeless tobacco: Never Used   Substance and Sexual Activity    Alcohol use: No    Drug use: Yes     Types: Marijuana, Methamphetamines     Comment: opiates/meth- 2013, last marijuana 6/2019    Sexual activity: Not on file   Lifestyle    Physical activity     Days per week: Not on file     Minutes per session: Not on file    Stress: Not on file   Relationships    Social connections     Talks on phone: Not on file     Gets together: Not on file     Attends Advent service: Not on file     Active member of club or organization: Not on file     Attends meetings of clubs or organizations: Not on file     Relationship status: Not on file    Intimate partner violence     Fear of current or ex partner: Not on file     Emotionally abused: Not on file     Physically abused: Not on file     Forced sexual activity: Not on file   Other Topics Concern    Not on file   Social History Narrative    Not on file     No current facility-administered medications for this encounter. Current Outpatient Medications   Medication Sig Dispense Refill    dicyclomine (BENTYL) 10 MG capsule Take 2 capsules by mouth every 6 hours as needed (cramps) 20 capsule 0    ondansetron (ZOFRAN ODT) 4 MG disintegrating tablet Take 1 tablet by mouth every 8 hours as needed for Nausea 15 tablet 0    buprenorphine-naloxone (SUBOXONE) 8-2 MG SUBL SL tablet Place 8 mg under the tongue daily.       ketorolac (TORADOL) 10 MG tablet Take 1 tablet by mouth every 6 hours as needed for Pain 20 tablet 0    ARIPiprazole (ABILIFY) 10 MG tablet Take 10 mg by mouth daily      Sucralfate (CARAFATE PO) Take by mouth 4 times daily      ibuprofen (ADVIL;MOTRIN) 600 MG tablet Take 1 tablet by mouth every 6 hours as needed for Pain (Patient taking differently: Take 800 mg by mouth every 6 hours as needed for Pain ) 20 tablet 0     No Known Allergies    REVIEW OF SYSTEMS  10 systems reviewed, pertinent positives per HPI otherwise noted to be negative    PHYSICAL EXAM  /67   Pulse 59   Temp 98.3 °F (36.8 °C) (Oral)   Resp 16   Wt 115 lb (52.2 kg)   SpO2 100%   BMI 23.23 kg/m²   GENERAL APPEARANCE: Awake and alert. Cooperative. Appears in moderate distress. Moaning. Grimacing. HEAD: Normocephalic. Atraumatic. EYES: EOM's grossly intact. Bilateral conjunctiva clear and without exudate. No conjunctival injection bilaterally. No scleral icterus. ENT: Mucous membranes are moist.   NECK: Supple. Normal ROM. Trachea is midline. CHEST: Equal and symmetric chest rise and fall. HEART: Regular rate and rhythm without murmurs. LUNGS: Respirations unlabored. Speaking comfortably in full sentences. Bilateral lung sounds clear to auscultation without wheezing, rhonchi or rales. ABDOMEN:  Soft and non distended. Tenderness with palpation to the right upper and lower quadrant. Negative Velazquez's or McBurney's point tenderness. Negative CVA tenderness bilaterally. No hernias or masses appreciated. No organomegaly. No rebound or guarding. Bowel sounds audible and active in all four quadrants. EXTREMITIES: Moves all extremities equally and neurovascularly intact. No edema. SKIN: Pink ,warm and dry. No acute rashes. NEUROLOGICAL: Alert and oriented x 4. Speech clear. No gross facial drooping. Strength is 5/5 in all extremities and sensation intact. Gait normal.   PSYCHIATRIC: Normal mood and affect. RADIOLOGY  Ct Abdomen Pelvis W Iv Contrast Additional Contrast? None    Result Date: 9/2/2020  EXAMINATION: CT OF THE ABDOMEN AND PELVIS WITH CONTRAST 9/2/2020 4:07 pm TECHNIQUE: CT of the abdomen and pelvis was performed with the administration of intravenous contrast. Multiplanar reformatted images are provided for review.  Dose modulation, iterative reconstruction, and/or weight based adjustment of the mA/kV was utilized to reduce the radiation dose to as low as reasonably achievable. COMPARISON: July 7, 2010 HISTORY: ORDERING SYSTEM PROVIDED HISTORY: RLQ pain TECHNOLOGIST PROVIDED HISTORY: Additional Contrast?->None Reason for exam:->RLQ pain Reason for Exam: RLQ pain Acuity: Acute Type of Exam: Initial Relevant Medical/Surgical History: partial hysterectomy FINDINGS: Lower Chest: No evidence of pneumonia or other acute findings. Organs: There is diffuse periportal edema. There is a hemangioma within the right hepatic lobe measuring 2.6 cm. No evidence of pancreatitis. No ureteral stone or hydronephrosis. No evidence of pyelonephritis. Indeterminate left adrenal nodule measuring 1.1 cm, not definitively seen on the remote comparison. GI/Bowel: No bowel obstruction or other acute process demonstrated. No evidence of colitis, diverticulitis or appendicitis. Normal appearance of the appendix. Pelvis: Fluid distends what appears to be lower uterine canal.  A solitary subcentimeter focus of air is present within the bladder lumen. No evidence of cystitis. Peritoneum/Retroperitoneum: No free air, ascites or abscess. Small fat containing umbilical hernia is present measuring 2.5 cm. No acute findings. Bones/Soft Tissues: There is new erosive change associated with the left SI joint. Right SI joint appears normal.     Normal appearance of the appendix. Periportal edema, nonspecific. Primary considerations are hepatitis and volume overload. Indeterminate 1.1 cm left adrenal nodule not definitively seen on the remote noncontrast CT examination from 2010. While this is most likely an adenoma, the lesion is indeterminate and recommend follow-up adrenal mass protocol pre and post contrast CT examination. Fluid distends what appears to be lower cervical canal.  Patient has a history of partial hysterectomy 1 year ago.   This may simply be menstrual fluid within remnant lower uterus or a distended vaginal canal protruding up into the previous location of the uterus. New erosive changes associated with the left SI joint consistent with sacroiliitis. This has developed since 2010. ED COURSE  I have evaluated this patient. Dr. Anahi Maciel was available for consultation. Patient seen and evaluated. Old records reviewed. Patient presenting for evaluation of abdominal pain with associated nausea without vomiting and constipation. Afebrile. Vital stable. Not tachycardic or tachypneic. Not hypoxic. Overall, well-appearing. Initially, patient appeared in moderate distress, was noted to be moaning and grimacing. Patient was initially given IV fluid hydration, analgesic and antiemetic without significant relief. CBC without leukocytosis or anemia. CMP unremarkable. Lipase normal.  Pregnancy negative. Urinalysis negative for hematuria or infection. CT abdomen and pelvis with IV contrast negative for appendicitis. There is mention of periportal edema, patient does have history of hepatitis. Additionally, there is noted with a left adrenal nodule, I did discuss this finding with the patient and she is to follow-up with her primary care physician. Patient did require second dose of IV analgesic, which did offer her some improvement of her discomfort. She is able to tolerate PO in the ER. Educated on possible viral in etiology, instructed to begin with a clear/bland diet advance as tolerated. A discussion was had with the patient regarding lab and imaging results as well as plan of care. Patient instructed to follow up with her PCP in 2-3 days for further evaluation/treatment. Patient will be discharged home with a prescription for the following medications below. Patient verbalizes understanding, all questions were answered and she is agreeable with the plan of care. Patient will return to ED for any new/worsening symptoms.     Patient was given scripts for the following medications. I counseled patient how to take these medications.    New Prescriptions    DICYCLOMINE (BENTYL) 10 MG CAPSULE    Take 2 capsules by mouth every 6 hours as needed (cramps)    ONDANSETRON (ZOFRAN ODT) 4 MG DISINTEGRATING TABLET    Take 1 tablet by mouth every 8 hours as needed for Nausea     MDM  Results for orders placed or performed during the hospital encounter of 09/02/20   CBC Auto Differential   Result Value Ref Range    WBC 7.0 4.0 - 11.0 K/uL    RBC 4.44 4.00 - 5.20 M/uL    Hemoglobin 13.0 12.0 - 16.0 g/dL    Hematocrit 38.9 36.0 - 48.0 %    MCV 87.7 80.0 - 100.0 fL    MCH 29.3 26.0 - 34.0 pg    MCHC 33.4 31.0 - 36.0 g/dL    RDW 14.6 12.4 - 15.4 %    Platelets 332 368 - 332 K/uL    MPV 8.5 5.0 - 10.5 fL    Neutrophils % 53.1 %    Lymphocytes % 37.4 %    Monocytes % 7.0 %    Eosinophils % 1.9 %    Basophils % 0.6 %    Neutrophils Absolute 3.7 1.7 - 7.7 K/uL    Lymphocytes Absolute 2.6 1.0 - 5.1 K/uL    Monocytes Absolute 0.5 0.0 - 1.3 K/uL    Eosinophils Absolute 0.1 0.0 - 0.6 K/uL    Basophils Absolute 0.0 0.0 - 0.2 K/uL   Comprehensive Metabolic Panel   Result Value Ref Range    Sodium 138 136 - 145 mmol/L    Potassium 3.7 3.5 - 5.1 mmol/L    Chloride 101 99 - 110 mmol/L    CO2 27 21 - 32 mmol/L    Anion Gap 10 3 - 16    Glucose 86 70 - 99 mg/dL    BUN 8 7 - 20 mg/dL    CREATININE <0.5 (L) 0.6 - 1.1 mg/dL    GFR Non-African American >60 >60    GFR African American >60 >60    Calcium 9.2 8.3 - 10.6 mg/dL    Total Protein 7.0 6.4 - 8.2 g/dL    Alb 4.4 3.4 - 5.0 g/dL    Albumin/Globulin Ratio 1.7 1.1 - 2.2    Total Bilirubin <0.2 0.0 - 1.0 mg/dL    Alkaline Phosphatase 75 40 - 129 U/L    ALT 55 (H) 10 - 40 U/L    AST 31 15 - 37 U/L    Globulin 2.6 g/dL   Urinalysis Reflex to Culture    Specimen: Urine, clean catch   Result Value Ref Range    Color, UA Yellow Straw/Yellow    Clarity, UA Clear Clear    Glucose, Ur Negative Negative mg/dL    Bilirubin Urine Negative Negative    Ketones, Urine Negative Negative mg/dL    Specific Gravity, UA 1.020 1.005 - 1.030    Blood, Urine Negative Negative    pH, UA 6.0 5.0 - 8.0    Protein, UA Negative Negative mg/dL    Urobilinogen, Urine 0.2 <2.0 E.U./dL    Nitrite, Urine POSITIVE (A) Negative    Leukocyte Esterase, Urine Negative Negative    Microscopic Examination YES     Urine Type NotGiven     Urine Reflex to Culture Not Indicated    Lipase   Result Value Ref Range    Lipase 18.0 13.0 - 60.0 U/L   HCG Qualitative, Serum   Result Value Ref Range    hCG Qual Negative Detects HCG level >10 MIU/mL   Microscopic Urinalysis   Result Value Ref Range    WBC, UA 3-5 0 - 5 /HPF    RBC, UA 0-2 0 - 4 /HPF    Epithelial Cells, UA 2-5 0 - 5 /HPF    Bacteria, UA Rare (A) None Seen /HPF         I estimate there is LOW risk for ACUTE APPENDICITIS, BOWEL OBSTRUCTION, CHOLECYSTITIS, DIVERTICULITIS, INCARCERATED HERNIA, PANCREATITIS, or PERFORATED BOWEL or ULCER, thus I consider the discharge disposition reasonable. Also, there is no evidence or peritonitis, sepsis, or toxicity. Rita Ferris and I have discussed the diagnosis and risks, and we agree with discharging home to follow-up with their primary doctor. We also discussed returning to the Emergency Department immediately if new or worsening symptoms occur. We have discussed the symptoms which are most concerning (e.g., bloody stool, fever, changing or worsening pain, vomiting) that necessitate immediate return. FINAL Impression    1. Abdominal pain, right lower quadrant    2. Adrenal nodule ([address]    3. Nausea        Blood pressure 116/67, pulse 59, temperature 98.3 °F (36.8 °C), temperature source Oral, resp. rate 16, weight 115 lb (52.2 kg), SpO2 100 %. DISPOSITION  Patient was discharged to home in stable condition.       DISCLAIMER:  Please note this report has been produced using speech recognition Dragon software and may contain errors related to that system including errors in grammar, punctuation, and spelling, as well as words and phrases that may be inappropriate. If there are any questions or concerns please feel free to contact the dictating provider for clarification.                 SUZANNA Barbosa - DILEEP  09/02/20 [id number]

## 2020-09-02 NOTE — ED NOTES
Pt scripts x2 instructed to follow up with PCP. Assessed per [facility] GABE.      Juju Shin LPN  22/54/59 [id number]

## 2020-12-16 ENCOUNTER — HOSPITAL ENCOUNTER (EMERGENCY)
Age: 35
Discharge: HOME | End: 2020-12-16
Attending: EMERGENCY MEDICINE
Payer: MEDICAID

## 2020-12-16 VITALS
DIASTOLIC BLOOD PRESSURE: 69 MMHG | TEMPERATURE: 98.5 F | OXYGEN SATURATION: 97 % | HEART RATE: 77 BPM | HEIGHT: 59 IN | RESPIRATION RATE: 16 BRPM | BODY MASS INDEX: 20.76 KG/M2 | SYSTOLIC BLOOD PRESSURE: 118 MMHG | WEIGHT: 103 LBS

## 2020-12-16 PROBLEM — N10 PYELONEPHRITIS, ACUTE: Status: ACTIVE | Noted: 2020-12-16

## 2020-12-16 PROBLEM — K76.9 LESION OF LIVER: Status: ACTIVE | Noted: 2020-04-26

## 2020-12-16 PROBLEM — B18.2 CHRONIC HEPATITIS C WITHOUT HEPATIC COMA (HCC): Status: ACTIVE | Noted: 2020-12-16

## 2020-12-16 PROBLEM — F15.20 METHAMPHETAMINE USE DISORDER, MODERATE (HCC): Status: ACTIVE | Noted: 2020-12-16

## 2020-12-16 PROBLEM — N39.0 SEPSIS DUE TO GRAM-NEGATIVE UTI (HCC): Status: ACTIVE | Noted: 2020-04-26

## 2020-12-16 PROBLEM — A41.50 SEPSIS DUE TO GRAM-NEGATIVE UTI (HCC): Status: ACTIVE | Noted: 2020-04-26

## 2020-12-16 PROBLEM — F39 MOOD DISORDER (HCC): Status: ACTIVE | Noted: 2020-12-16

## 2020-12-16 LAB
A/G RATIO: 1.5 (ref 1.1–2.2)
ALBUMIN SERPL-MCNC: 4.4 G/DL (ref 3.4–5)
ALP BLD-CCNC: 72 U/L (ref 40–129)
ALT SERPL-CCNC: 19 U/L (ref 10–40)
ANION GAP SERPL CALCULATED.3IONS-SCNC: 12 MMOL/L (ref 3–16)
AST SERPL-CCNC: 18 U/L (ref 15–37)
BACTERIA: ABNORMAL /HPF
BASOPHILS ABSOLUTE: 0.1 K/UL (ref 0–0.2)
BASOPHILS RELATIVE PERCENT: 0.5 %
BILIRUB SERPL-MCNC: 0.3 MG/DL (ref 0–1)
BILIRUBIN URINE: NEGATIVE
BLOOD, URINE: ABNORMAL
BUN BLDV-MCNC: 5 MG/DL (ref 7–20)
CALCIUM SERPL-MCNC: 9.9 MG/DL (ref 8.3–10.6)
CHLORIDE BLD-SCNC: 102 MMOL/L (ref 99–110)
CLARITY: ABNORMAL
CO2: 25 MMOL/L (ref 21–32)
COLOR: YELLOW
CREAT SERPL-MCNC: <0.5 MG/DL (ref 0.6–1.1)
EOSINOPHILS ABSOLUTE: 0.1 K/UL (ref 0–0.6)
EOSINOPHILS RELATIVE PERCENT: 0.5 %
EPITHELIAL CELLS, UA: ABNORMAL /HPF (ref 0–5)
GFR AFRICAN AMERICAN: >60
GFR NON-AFRICAN AMERICAN: >60
GLOBULIN: 3 G/DL
GLUCOSE BLD-MCNC: 110 MG/DL (ref 70–99)
GLUCOSE URINE: NEGATIVE MG/DL
HCT VFR BLD CALC: 41.2 % (ref 36–48)
HEMOGLOBIN: 13.7 G/DL (ref 12–16)
KETONES, URINE: NEGATIVE MG/DL
LEUKOCYTE ESTERASE, URINE: ABNORMAL
LYMPHOCYTES ABSOLUTE: 2.2 K/UL (ref 1–5.1)
LYMPHOCYTES RELATIVE PERCENT: 19.1 %
MCH RBC QN AUTO: 28.9 PG (ref 26–34)
MCHC RBC AUTO-ENTMCNC: 33.2 G/DL (ref 31–36)
MCV RBC AUTO: 86.9 FL (ref 80–100)
MICROSCOPIC EXAMINATION: YES
MONOCYTES ABSOLUTE: 0.7 K/UL (ref 0–1.3)
MONOCYTES RELATIVE PERCENT: 6.1 %
NEUTROPHILS ABSOLUTE: 8.4 K/UL (ref 1.7–7.7)
NEUTROPHILS RELATIVE PERCENT: 73.8 %
NITRITE, URINE: POSITIVE
PDW BLD-RTO: 13.8 % (ref 12.4–15.4)
PH UA: 7.5 (ref 5–8)
PLATELET # BLD: 244 K/UL (ref 135–450)
PMV BLD AUTO: 9.6 FL (ref 5–10.5)
POTASSIUM SERPL-SCNC: 3.5 MMOL/L (ref 3.5–5.1)
PROTEIN UA: 30 MG/DL
RBC # BLD: 4.74 M/UL (ref 4–5.2)
RBC UA: ABNORMAL /HPF (ref 0–4)
SODIUM BLD-SCNC: 139 MMOL/L (ref 136–145)
SPECIFIC GRAVITY UA: 1.02 (ref 1–1.03)
TOTAL PROTEIN: 7.4 G/DL (ref 6.4–8.2)
URINE TYPE: ABNORMAL
UROBILINOGEN, URINE: 0.2 E.U./DL
WBC # BLD: 11.3 K/UL (ref 4–11)
WBC UA: ABNORMAL /HPF (ref 0–5)

## 2020-12-16 PROCEDURE — 81001 URINALYSIS AUTO W/SCOPE: CPT

## 2020-12-16 PROCEDURE — 87086 URINE CULTURE/COLONY COUNT: CPT

## 2020-12-16 PROCEDURE — 80053 COMPREHEN METABOLIC PANEL: CPT

## 2020-12-16 PROCEDURE — 87088 URINE BACTERIA CULTURE: CPT

## 2020-12-16 PROCEDURE — 99285 EMERGENCY DEPT VISIT HI MDM: CPT

## 2020-12-16 PROCEDURE — 87186 SC STD MICRODIL/AGAR DIL: CPT

## 2020-12-16 PROCEDURE — 85025 COMPLETE CBC W/AUTO DIFF WBC: CPT

## 2020-12-16 PROCEDURE — 6370000000 HC RX 637 (ALT 250 FOR IP): Performed by: EMERGENCY MEDICINE

## 2020-12-16 PROCEDURE — 6360000002 HC RX W HCPCS: Performed by: EMERGENCY MEDICINE

## 2020-12-16 PROCEDURE — 96374 THER/PROPH/DIAG INJ IV PUSH: CPT

## 2020-12-16 RX ORDER — NITROFURANTOIN 25; 75 MG/1; MG/1
CAPSULE ORAL
COMMUNITY
End: 2020-12-16

## 2020-12-16 RX ORDER — OMEPRAZOLE 20 MG/1
CAPSULE, DELAYED RELEASE ORAL
COMMUNITY
End: 2022-01-31 | Stop reason: ALTCHOICE

## 2020-12-16 RX ORDER — ACETAMINOPHEN 500 MG
1000 TABLET ORAL ONCE
Status: COMPLETED | OUTPATIENT
Start: 2020-12-16 | End: 2020-12-16

## 2020-12-16 RX ORDER — ESCITALOPRAM OXALATE 10 MG/1
20 TABLET ORAL DAILY
COMMUNITY
End: 2022-01-31 | Stop reason: ALTCHOICE

## 2020-12-16 RX ORDER — KETOROLAC TROMETHAMINE 30 MG/ML
30 INJECTION, SOLUTION INTRAMUSCULAR; INTRAVENOUS ONCE
Status: COMPLETED | OUTPATIENT
Start: 2020-12-16 | End: 2020-12-16

## 2020-12-16 RX ORDER — IBUPROFEN 400 MG/1
400 TABLET ORAL EVERY 6 HOURS PRN
Qty: 20 TABLET | Refills: 0 | Status: SHIPPED | OUTPATIENT
Start: 2020-12-16 | End: 2022-01-31 | Stop reason: ALTCHOICE

## 2020-12-16 RX ORDER — CEPHALEXIN 500 MG/1
500 CAPSULE ORAL 4 TIMES DAILY
Qty: 28 CAPSULE | Refills: 0 | Status: SHIPPED | OUTPATIENT
Start: 2020-12-16 | End: 2020-12-23

## 2020-12-16 RX ORDER — SULFAMETHOXAZOLE AND TRIMETHOPRIM 800; 160 MG/1; MG/1
TABLET ORAL
COMMUNITY
End: 2020-12-16

## 2020-12-16 RX ORDER — HYDROXYZINE PAMOATE 25 MG/1
50 CAPSULE ORAL ONCE
Status: COMPLETED | OUTPATIENT
Start: 2020-12-16 | End: 2020-12-16

## 2020-12-16 RX ADMIN — ACETAMINOPHEN 1000 MG: 500 TABLET ORAL at 04:10

## 2020-12-16 RX ADMIN — HYDROXYZINE PAMOATE 50 MG: 25 CAPSULE ORAL at 04:10

## 2020-12-16 RX ADMIN — KETOROLAC TROMETHAMINE 30 MG: 30 INJECTION, SOLUTION INTRAMUSCULAR at 05:24

## 2020-12-16 ASSESSMENT — PAIN SCALES - GENERAL
PAINLEVEL_OUTOF10: 6
PAINLEVEL_OUTOF10: 6

## 2020-12-16 NOTE — ED NOTES
Bed: 14  Expected date:   Expected time:   Means of arrival:   Comments:  [id number] Brendne Ocampo RN  12/16/20 [id number]

## 2020-12-16 NOTE — ED PROVIDER NOTES
[address] [facility]  ED  eMERGENCY dEPARTMENTeNCOUnter      Pt Name: Jessy Bowie  MRN: [id number]  Armssharifgfmarine 1985  Date of evaluation: 12/16/2020  Provider: José Miguel Lizarraga MD    [address] [place] [address]       Chief Complaint   Patient presents with    Flank Pain     pt reports a few days of generalized body aches and bilateral flank pain. pt reports last time this happened in april she had pneumonia and sepsis. pt last used meth by injection tonight. drug user for 25 years    Generalized Body Aches         HISTORY OF PRESENT ILLNESS   (Location/Symptom, Timing/Onset,Context/Setting, Quality, Duration, Modifying Factors, Severity)  Note limiting factors. Jessy Bowie is a 38 y.o. female who presents to the emergency department for dysuria and generalized body aches. She also complains of abdominal pain radiating to the right flank. She states that she has had urinary tract infections in the past and she was 1 septic. Patient also states that she feels very anxious and that she wants to get out of her skin. She states that she has been trying to see a psychiatrist for changes to her medication but has not been able to do so. She does admit to using methamphetamines. No chest pain. No shortness of breath. No fevers or chills. Nursing notes were reviewed. REVIEW OF SYSTEMS    (2-9 systems for level 4, 10 or more for level 5)     Review of Systems    Positive and pertinent negative as per HPI. Except as noted above in the ROS, all other systems were reviewed and were negative.     PAST MEDICAL HISTORY     Past Medical History:   Diagnosis Date    Hepatitis C     Other disorders of kidney and ureter          SURGICALHISTORY       Past Surgical History:   Procedure Laterality Date    CYSTOSCOPY      stents    ENDOSCOPY, COLON, DIAGNOSTIC      HYSTERECTOMY N/A 9/23/2019    HYSTERECTOMY ABDOMINAL LAPAROSCOPIC ROBOTIC XI BILATERAL SALPINGECTOMY WITH CYSTOSCOPY performed by Jabari Ragland MD at [address]       Discharge Medication List as of 12/16/2020  5:51 AM      CONTINUE these medications which have NOT CHANGED    Details   omeprazole (PRILOSEC) 20 MG delayed release capsule omeprazole 20 mg capsule,delayed releaseHistorical Med      escitalopram (LEXAPRO) 10 MG tablet Take 20 mg by mouth dailyHistorical Med      buprenorphine-naloxone (SUBOXONE) 8-2 MG SUBL SL tablet Place 8 mg under the tongue daily. Historical Med      ARIPiprazole (ABILIFY) 10 MG tablet Take 10 mg by mouth dailyHistorical Med             ALLERGIES     Patient has no known allergies. FAMILY HISTORY     History reviewed. No pertinent family history.        SOCIAL HISTORY       Social History     Socioeconomic History    Marital status: Single     Spouse name: None    Number of children: None    Years of education: None    Highest education level: None   Occupational History    None   Social Needs    Financial resource strain: None    Food insecurity     Worry: None     Inability: None    Transportation needs     Medical: None     Non-medical: None   Tobacco Use    Smoking status: Current Every Day Smoker     Packs/day: 0.75     Types: Cigarettes    Smokeless tobacco: Never Used   Substance and Sexual Activity    Alcohol use: No    Drug use: Yes     Types: Marijuana, Methamphetamines     Comment: opiates/meth- 2013, last marijuana 6/2019    Sexual activity: None   Lifestyle    Physical activity     Days per week: None     Minutes per session: None    Stress: None   Relationships    Social connections     Talks on phone: None     Gets together: None     Attends Tenriism service: None     Active member of club or organization: None     Attends meetings of clubs or organizations: None     Relationship status: None    Intimate partner violence     Fear of current or ex partner: None     Emotionally abused: None     Physically abused: None     Forced sexual activity: None   Other Topics Concern    None   Social History Narrative    None       SCREENINGS    Rinku Coma Scale  Eye Opening: Spontaneous  Best Verbal Response: Oriented  Best Motor Response: Obeys commands  Rinku Coma Scale Score: 15        PHYSICAL EXAM    (up to 7 for level 4, 8 or more for level 5)     ED Triage Vitals [12/16/20 0323]   BP Temp Temp Source Pulse Resp SpO2 Height Weight   136/82 98.5 °F (36.9 °C) Oral 80 18 98 % 4' 11\" (1.499 m) 103 lb (46.7 kg)       Physical Exam  Vitals signs and nursing note reviewed. Constitutional:       Appearance: Normal appearance. She is well-developed. She is not ill-appearing. HENT:      Head: Normocephalic and atraumatic. Right Ear: External ear normal.      Left Ear: External ear normal.      Nose: Nose normal.   Eyes:      General: No scleral icterus. Right eye: No discharge. Left eye: No discharge. Conjunctiva/sclera: Conjunctivae normal.   Neck:      Musculoskeletal: Neck supple. Cardiovascular:      Rate and Rhythm: Normal rate and regular rhythm. Heart sounds: Normal heart sounds. Pulmonary:      Effort: Pulmonary effort is normal. No respiratory distress. Breath sounds: Normal breath sounds. No wheezing or rales. Abdominal:      General: Bowel sounds are normal. There is no distension. Palpations: Abdomen is soft. Tenderness: There is abdominal tenderness. There is right CVA tenderness. There is no guarding or rebound. Skin:     Coloration: Skin is not pale. Neurological:      Mental Status: She is alert.    Psychiatric:      Comments: Patient is anxious and restless           DIAGNOSTIC RESULTS     EKG: All EKG's are interpreted by the Emergency Department Physician who either signs or Co-signs this chart in the absence of a cardiologist.    12 lead EKG shows     RADIOLOGY:   Non-plain film images such as CT, Ultrasound and MRI are read by the radiologist. Plain radiographic images are visualized and 77   Resp: 18 16 16   Temp: 98.5 °F (36.9 °C)     TempSrc: Oral     SpO2: 98% 98% 97%   Weight: 103 lb (46.7 kg)     Height: 4' 11\" (1.499 m)         Adult female who comes in with symptoms of urinary tract infection also complains of restlessness and unable to sleep. Patient does admit to using methamphetamines. I do give her hydroxyzine here and she is asking for some other medications. The patient has a history of substance abuse and I am not comfortable prescribing her given her any medications here would help with her anxiety. She does have findings of a urinary tract infection the specimen appears contaminated but she does have nitrite and bacteria and her symptoms correlate so I will treat with antibiotics. I recommend follow-up in the primary care setting. I do offer the patient resources for substance abuse but she states she already has those services available to her. Return instructions are discussed. CRITICAL CARE TIME   None       CONSULTS:  None    PROCEDURES:  Unless otherwise noted above, none     Procedures    FINAL IMPRESSION      1. Urinary tract infection without hematuria, site unspecified    2.  Methamphetamine use [facility])          DISPOSITION/PLAN   DISPOSITION Decision To Discharge 12/16/2020 05:36:23 AM      PATIENT REFERREDTO:  Ryan Hernández, APRN - CNP  [id number] [address]  [phone]            DISCHARGEMEDICATIONS:  Discharge Medication List as of 12/16/2020  5:51 AM      START taking these medications    Details   cephALEXin (KEFLEX) 500 MG capsule Take 1 capsule by mouth 4 times daily for 7 days, Disp-28 capsule, R-0Print                (Please note that portions of this note were completed with a voice recognition program.  Efforts were made to edit the dictations but occasionally words are mis-transcribed.)    Bernell Councilman, MD (electronically signed)  Attending Emergency Physician        Bernell Councilman, MD  12/16/20 6237

## 2020-12-18 LAB
ORGANISM: ABNORMAL
URINE CULTURE, ROUTINE: ABNORMAL

## 2021-08-26 ENCOUNTER — HOSPITAL ENCOUNTER (EMERGENCY)
Age: 36
Discharge: HOME | End: 2021-08-26
Attending: EMERGENCY MEDICINE
Payer: MEDICAID

## 2021-08-26 VITALS
DIASTOLIC BLOOD PRESSURE: 86 MMHG | HEIGHT: 59 IN | BODY MASS INDEX: 23.18 KG/M2 | HEART RATE: 82 BPM | OXYGEN SATURATION: 98 % | TEMPERATURE: 98.5 F | SYSTOLIC BLOOD PRESSURE: 125 MMHG | WEIGHT: 115 LBS | RESPIRATION RATE: 17 BRPM

## 2021-08-26 DIAGNOSIS — T43.625A ADVERSE EFFECT OF AMPHETAMINE, INITIAL ENCOUNTER: ICD-10-CM

## 2021-08-26 DIAGNOSIS — F41.1 ANXIETY STATE: Primary | ICD-10-CM

## 2021-08-26 LAB
ALBUMIN SERPL-MCNC: 4.5 G/DL (ref 3.4–5)
ALP BLD-CCNC: 86 U/L (ref 40–129)
ALT SERPL-CCNC: 13 U/L (ref 10–40)
AMPHETAMINE SCREEN, URINE: POSITIVE
ANION GAP SERPL CALCULATED.3IONS-SCNC: 16 MMOL/L (ref 3–16)
AST SERPL-CCNC: 22 U/L (ref 15–37)
BACTERIA: ABNORMAL /HPF
BARBITURATE SCREEN URINE: ABNORMAL
BASOPHILS ABSOLUTE: 0 K/UL (ref 0–0.2)
BASOPHILS RELATIVE PERCENT: 0.3 %
BENZODIAZEPINE SCREEN, URINE: ABNORMAL
BILIRUB SERPL-MCNC: 0.5 MG/DL (ref 0–1)
BILIRUBIN DIRECT: <0.2 MG/DL (ref 0–0.3)
BILIRUBIN URINE: NEGATIVE
BILIRUBIN, INDIRECT: ABNORMAL MG/DL (ref 0–1)
BLOOD, URINE: ABNORMAL
BUN BLDV-MCNC: 10 MG/DL (ref 7–20)
CALCIUM SERPL-MCNC: 10.1 MG/DL (ref 8.3–10.6)
CANNABINOID SCREEN URINE: POSITIVE
CHLORIDE BLD-SCNC: 102 MMOL/L (ref 99–110)
CLARITY: CLEAR
CO2: 19 MMOL/L (ref 21–32)
COCAINE METABOLITE SCREEN URINE: ABNORMAL
COLOR: YELLOW
CREAT SERPL-MCNC: <0.5 MG/DL (ref 0.6–1.1)
EOSINOPHILS ABSOLUTE: 0.1 K/UL (ref 0–0.6)
EOSINOPHILS RELATIVE PERCENT: 0.5 %
EPITHELIAL CELLS, UA: ABNORMAL /HPF (ref 0–5)
GFR AFRICAN AMERICAN: >60
GFR NON-AFRICAN AMERICAN: >60
GLUCOSE BLD-MCNC: 98 MG/DL (ref 70–99)
GLUCOSE URINE: NEGATIVE MG/DL
HCG(URINE) PREGNANCY TEST: NEGATIVE
HCT VFR BLD CALC: 42 % (ref 36–48)
HEMOGLOBIN: 14.5 G/DL (ref 12–16)
KETONES, URINE: >=80 MG/DL
LEUKOCYTE ESTERASE, URINE: ABNORMAL
LYMPHOCYTES ABSOLUTE: 2.9 K/UL (ref 1–5.1)
LYMPHOCYTES RELATIVE PERCENT: 20.7 %
Lab: ABNORMAL
MAGNESIUM: 2.2 MG/DL (ref 1.8–2.4)
MCH RBC QN AUTO: 30.8 PG (ref 26–34)
MCHC RBC AUTO-ENTMCNC: 34.5 G/DL (ref 31–36)
MCV RBC AUTO: 89.5 FL (ref 80–100)
METHADONE SCREEN, URINE: ABNORMAL
MICROSCOPIC EXAMINATION: YES
MONOCYTES ABSOLUTE: 1 K/UL (ref 0–1.3)
MONOCYTES RELATIVE PERCENT: 6.9 %
MUCUS: ABNORMAL /LPF
NEUTROPHILS ABSOLUTE: 10.2 K/UL (ref 1.7–7.7)
NEUTROPHILS RELATIVE PERCENT: 71.6 %
NITRITE, URINE: NEGATIVE
OPIATE SCREEN URINE: ABNORMAL
OXYCODONE URINE: ABNORMAL
PDW BLD-RTO: 13.5 % (ref 12.4–15.4)
PH UA: 6
PH UA: 6 (ref 5–8)
PHENCYCLIDINE SCREEN URINE: ABNORMAL
PLATELET # BLD: 283 K/UL (ref 135–450)
PMV BLD AUTO: 8.3 FL (ref 5–10.5)
POTASSIUM REFLEX MAGNESIUM: 3.5 MMOL/L (ref 3.5–5.1)
PROPOXYPHENE SCREEN: ABNORMAL
PROTEIN UA: 30 MG/DL
RBC # BLD: 4.69 M/UL (ref 4–5.2)
RBC UA: ABNORMAL /HPF (ref 0–4)
SODIUM BLD-SCNC: 137 MMOL/L (ref 136–145)
SPECIFIC GRAVITY UA: >=1.03 (ref 1–1.03)
TOTAL PROTEIN: 8.3 G/DL (ref 6.4–8.2)
URINE TYPE: ABNORMAL
UROBILINOGEN, URINE: 0.2 E.U./DL
WBC # BLD: 14.2 K/UL (ref 4–11)
WBC UA: ABNORMAL /HPF (ref 0–5)

## 2021-08-26 PROCEDURE — 83735 ASSAY OF MAGNESIUM: CPT

## 2021-08-26 PROCEDURE — 84703 CHORIONIC GONADOTROPIN ASSAY: CPT

## 2021-08-26 PROCEDURE — 85025 COMPLETE CBC W/AUTO DIFF WBC: CPT

## 2021-08-26 PROCEDURE — 80048 BASIC METABOLIC PNL TOTAL CA: CPT

## 2021-08-26 PROCEDURE — 2580000003 HC RX 258: Performed by: EMERGENCY MEDICINE

## 2021-08-26 PROCEDURE — 6370000000 HC RX 637 (ALT 250 FOR IP): Performed by: EMERGENCY MEDICINE

## 2021-08-26 PROCEDURE — 80307 DRUG TEST PRSMV CHEM ANLYZR: CPT

## 2021-08-26 PROCEDURE — 99283 EMERGENCY DEPT VISIT LOW MDM: CPT

## 2021-08-26 PROCEDURE — 81001 URINALYSIS AUTO W/SCOPE: CPT

## 2021-08-26 PROCEDURE — 80076 HEPATIC FUNCTION PANEL: CPT

## 2021-08-26 RX ORDER — 0.9 % SODIUM CHLORIDE 0.9 %
1000 INTRAVENOUS SOLUTION INTRAVENOUS ONCE
Status: COMPLETED | OUTPATIENT
Start: 2021-08-26 | End: 2021-08-26

## 2021-08-26 RX ORDER — 0.9 % SODIUM CHLORIDE 0.9 %
1000 INTRAVENOUS SOLUTION INTRAVENOUS ONCE
Status: DISCONTINUED | OUTPATIENT
Start: 2021-08-26 | End: 2021-08-26

## 2021-08-26 RX ORDER — DIAZEPAM 5 MG/1
5 TABLET ORAL ONCE
Status: COMPLETED | OUTPATIENT
Start: 2021-08-26 | End: 2021-08-26

## 2021-08-26 RX ADMIN — DIAZEPAM 5 MG: 5 TABLET ORAL at 09:12

## 2021-08-26 RX ADMIN — SODIUM CHLORIDE 1000 ML: 9 INJECTION, SOLUTION INTRAVENOUS at 09:12

## 2021-08-26 NOTE — ED NOTES
Triage and screening questions unable to get clear answers due to patient not able to give a coherent answer.      Lamar Fernandez RN  08/26/21 9938
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
## 2022-01-25 ENCOUNTER — ANESTHESIA EVENT (OUTPATIENT)
Dept: OPERATING ROOM | Age: 37
End: 2022-01-25
Payer: MEDICAID

## 2022-01-31 ENCOUNTER — HOSPITAL ENCOUNTER (OUTPATIENT)
Age: 37
Discharge: HOME | End: 2022-01-31
Payer: MEDICAID

## 2022-01-31 PROCEDURE — U0005 INFEC AGEN DETEC AMPLI PROBE: HCPCS

## 2022-01-31 PROCEDURE — U0003 INFECTIOUS AGENT DETECTION BY NUCLEIC ACID (DNA OR RNA); SEVERE ACUTE RESPIRATORY SYNDROME CORONAVIRUS 2 (SARS-COV-2) (CORONAVIRUS DISEASE [COVID-19]), AMPLIFIED PROBE TECHNIQUE, MAKING USE OF HIGH THROUGHPUT TECHNOLOGIES AS DESCRIBED BY CMS-2020-01-R: HCPCS

## 2022-01-31 RX ORDER — QUETIAPINE FUMARATE 50 MG/1
100 TABLET, EXTENDED RELEASE ORAL NIGHTLY
COMMUNITY

## 2022-01-31 RX ORDER — VENLAFAXINE HYDROCHLORIDE 150 MG/1
150 CAPSULE, EXTENDED RELEASE ORAL DAILY
COMMUNITY

## 2022-01-31 NOTE — PROGRESS NOTES
PRE OP INSTRUCTION SHEET   1. Do not eat or drink anything after 12 midnight  prior to surgery. This includes no water, chewing gum or mints. 2. Take the following pills will a small sip of water (see MAR)                                        3. Aspirin, Ibuprofen, Advil, Naproxen, Vitamin E, fish oil and other Anti-inflammatory products should be stopped for 5 days before surgery or as directed by your physician. 4. Check with your Doctor regarding stopping Plavix, Coumadin, Lovenox, Fragmin or other blood thinners   5. Do not smoke, and do not drink any alcoholic beverages 24 hours prior to surgery. This includes NA Beer. 6. You may brush your teeth and gargle the morning of surgery. DO NOT SWALLOW WATER   7. You MUST make arrangements for a responsible adult to take you home after your surgery. You will not be allowed to leave alone or drive yourself home. It is strongly suggested someone stay with you the first 24 hrs. Your surgery will be cancelled if you do not have a ride home. 8. A parent/legal guardian must accompany a child scheduled for surgery and plan to stay at the hospital until the child is discharged. Please do not bring other children with you. 9. Please wear simple, loose fitting clothing to the hospital.  Eliseo Herndon not bring valuables (money, credit cards, checkbooks, etc.) Do not wear any makeup (including no eye makeup) or nail polish on your fingers or toes. 10. DO NOT wear any jewelry or piercings on day of surgery. All body piercing jewelry must be removed. 11. If you have dentures,glasses, or contacts they will be removed before going to the OR; we will provide you a container. 12. Please see your family doctor/and cardiologist for a history & physical and/or concerning medications. Bring any test results/reports from your physician's office. Have history and labs faxed to [phone].  Remember to bring Blood Bank bracelet on the day of surgery. 14. If you have a Living Will and Durable Power of  for Healthcare, please bring in a copy. 13. Notify your Surgeon if you develop any illness between now and surgery  time, cough, cold, fever, sore throat, nausea, vomiting, etc.  Please notify your surgeon if you experience dizziness, shortness of breath or blurred vision between now & the time of your surgery   16. DO NOT shave your operative site 96 hours prior to surgery. For face & neck surgery, men may use an electric razor 48 hours prior to surgery. 17. Shower with _x__Antibacterial soap (x_chlorhexidine for total joint  Pt's) shower two times before surgery.(the morning of and the night before. 18. To provide excellent care visitors will be limited to one in the room at any given time.   Please call pre admission testing if you any further questions [phone] or [phone]

## 2022-02-01 LAB — SARS-COV-2: NOT DETECTED

## 2022-02-01 NOTE — PROGRESS NOTES
Called [facility] urgent care for copy of H&P. Stated pt was not seen at this location.  Called and left message for pt to call me with information regarding H&P.

## 2022-02-02 ENCOUNTER — ANESTHESIA (OUTPATIENT)
Dept: OPERATING ROOM | Age: 37
End: 2022-02-02
Payer: MEDICAID

## 2022-02-02 ENCOUNTER — HOSPITAL ENCOUNTER (OUTPATIENT)
Age: 37
Setting detail: OUTPATIENT SURGERY
Discharge: HOME | End: 2022-02-02
Attending: UROLOGY | Admitting: UROLOGY
Payer: MEDICAID

## 2022-02-02 VITALS
BODY MASS INDEX: 28.22 KG/M2 | OXYGEN SATURATION: 98 % | RESPIRATION RATE: 12 BRPM | HEART RATE: 63 BPM | DIASTOLIC BLOOD PRESSURE: 66 MMHG | WEIGHT: 140 LBS | TEMPERATURE: 97.4 F | HEIGHT: 59 IN | SYSTOLIC BLOOD PRESSURE: 121 MMHG

## 2022-02-02 VITALS — SYSTOLIC BLOOD PRESSURE: 98 MMHG | OXYGEN SATURATION: 95 % | DIASTOLIC BLOOD PRESSURE: 55 MMHG

## 2022-02-02 DIAGNOSIS — N81.11 CYSTOCELE, MIDLINE: Primary | ICD-10-CM

## 2022-02-02 PROCEDURE — 6360000002 HC RX W HCPCS: Performed by: UROLOGY

## 2022-02-02 PROCEDURE — 7100000010 HC PHASE II RECOVERY - FIRST 15 MIN: Performed by: UROLOGY

## 2022-02-02 PROCEDURE — 6360000002 HC RX W HCPCS

## 2022-02-02 PROCEDURE — 3600000004 HC SURGERY LEVEL 4 BASE: Performed by: UROLOGY

## 2022-02-02 PROCEDURE — 3700000000 HC ANESTHESIA ATTENDED CARE: Performed by: UROLOGY

## 2022-02-02 PROCEDURE — 2709999900 HC NON-CHARGEABLE SUPPLY: Performed by: UROLOGY

## 2022-02-02 PROCEDURE — 2580000003 HC RX 258

## 2022-02-02 PROCEDURE — 2500000003 HC RX 250 WO HCPCS

## 2022-02-02 PROCEDURE — C1771 REP DEV, URINARY, W/SLING: HCPCS | Performed by: UROLOGY

## 2022-02-02 PROCEDURE — 7100000011 HC PHASE II RECOVERY - ADDTL 15 MIN: Performed by: UROLOGY

## 2022-02-02 PROCEDURE — 3600000014 HC SURGERY LEVEL 4 ADDTL 15MIN: Performed by: UROLOGY

## 2022-02-02 PROCEDURE — 6370000000 HC RX 637 (ALT 250 FOR IP): Performed by: ANESTHESIOLOGY

## 2022-02-02 PROCEDURE — 2580000003 HC RX 258: Performed by: ANESTHESIOLOGY

## 2022-02-02 PROCEDURE — 7100000000 HC PACU RECOVERY - FIRST 15 MIN: Performed by: UROLOGY

## 2022-02-02 PROCEDURE — 7100000001 HC PACU RECOVERY - ADDTL 15 MIN: Performed by: UROLOGY

## 2022-02-02 PROCEDURE — 6360000002 HC RX W HCPCS: Performed by: ANESTHESIOLOGY

## 2022-02-02 PROCEDURE — 3700000001 HC ADD 15 MINUTES (ANESTHESIA): Performed by: UROLOGY

## 2022-02-02 RX ORDER — GENTAMICIN SULFATE 40 MG/ML
INJECTION, SOLUTION INTRAMUSCULAR; INTRAVENOUS PRN
Status: DISCONTINUED | OUTPATIENT
Start: 2022-02-02 | End: 2022-02-02 | Stop reason: ALTCHOICE

## 2022-02-02 RX ORDER — LIDOCAINE HYDROCHLORIDE 10 MG/ML
1 INJECTION, SOLUTION EPIDURAL; INFILTRATION; INTRACAUDAL; PERINEURAL
Status: DISCONTINUED | OUTPATIENT
Start: 2022-02-02 | End: 2022-02-02 | Stop reason: HOSPADM

## 2022-02-02 RX ORDER — SODIUM CHLORIDE, SODIUM LACTATE, POTASSIUM CHLORIDE, CALCIUM CHLORIDE 600; 310; 30; 20 MG/100ML; MG/100ML; MG/100ML; MG/100ML
INJECTION, SOLUTION INTRAVENOUS CONTINUOUS
Status: DISCONTINUED | OUTPATIENT
Start: 2022-02-02 | End: 2022-02-02 | Stop reason: HOSPADM

## 2022-02-02 RX ORDER — DOCUSATE SODIUM 100 MG/1
100 CAPSULE, LIQUID FILLED ORAL 2 TIMES DAILY
Qty: 60 CAPSULE | Refills: 0 | Status: SHIPPED | OUTPATIENT
Start: 2022-02-02 | End: 2022-03-04

## 2022-02-02 RX ORDER — DEXAMETHASONE SODIUM PHOSPHATE 4 MG/ML
INJECTION, SOLUTION INTRA-ARTICULAR; INTRALESIONAL; INTRAMUSCULAR; INTRAVENOUS; SOFT TISSUE PRN
Status: DISCONTINUED | OUTPATIENT
Start: 2022-02-02 | End: 2022-02-02 | Stop reason: SDUPTHER

## 2022-02-02 RX ORDER — HYDRALAZINE HYDROCHLORIDE 20 MG/ML
5 INJECTION INTRAMUSCULAR; INTRAVENOUS EVERY 10 MIN PRN
Status: DISCONTINUED | OUTPATIENT
Start: 2022-02-02 | End: 2022-02-02 | Stop reason: HOSPADM

## 2022-02-02 RX ORDER — PHENYLEPHRINE HCL IN 0.9% NACL 1 MG/10 ML
SYRINGE (ML) INTRAVENOUS PRN
Status: DISCONTINUED | OUTPATIENT
Start: 2022-02-02 | End: 2022-02-02 | Stop reason: SDUPTHER

## 2022-02-02 RX ORDER — MEPERIDINE HYDROCHLORIDE 25 MG/ML
12.5 INJECTION INTRAMUSCULAR; INTRAVENOUS; SUBCUTANEOUS EVERY 5 MIN PRN
Status: DISCONTINUED | OUTPATIENT
Start: 2022-02-02 | End: 2022-02-02 | Stop reason: HOSPADM

## 2022-02-02 RX ORDER — LABETALOL HYDROCHLORIDE 5 MG/ML
5 INJECTION, SOLUTION INTRAVENOUS EVERY 10 MIN PRN
Status: DISCONTINUED | OUTPATIENT
Start: 2022-02-02 | End: 2022-02-02 | Stop reason: HOSPADM

## 2022-02-02 RX ORDER — MIDAZOLAM HYDROCHLORIDE 1 MG/ML
INJECTION INTRAMUSCULAR; INTRAVENOUS PRN
Status: DISCONTINUED | OUTPATIENT
Start: 2022-02-02 | End: 2022-02-02 | Stop reason: SDUPTHER

## 2022-02-02 RX ORDER — PHENAZOPYRIDINE HYDROCHLORIDE 200 MG/1
200 TABLET, FILM COATED ORAL 3 TIMES DAILY PRN
Qty: 15 TABLET | Refills: 0 | Status: SHIPPED | OUTPATIENT
Start: 2022-02-02 | End: 2022-02-07

## 2022-02-02 RX ORDER — MIDAZOLAM HYDROCHLORIDE 1 MG/ML
2 INJECTION INTRAMUSCULAR; INTRAVENOUS ONCE
Status: COMPLETED | OUTPATIENT
Start: 2022-02-02 | End: 2022-02-02

## 2022-02-02 RX ORDER — SODIUM CHLORIDE 9 MG/ML
25 INJECTION, SOLUTION INTRAVENOUS PRN
Status: DISCONTINUED | OUTPATIENT
Start: 2022-02-02 | End: 2022-02-02 | Stop reason: HOSPADM

## 2022-02-02 RX ORDER — OXYCODONE HYDROCHLORIDE AND ACETAMINOPHEN 5; 325 MG/1; MG/1
2 TABLET ORAL PRN
Status: COMPLETED | OUTPATIENT
Start: 2022-02-02 | End: 2022-02-02

## 2022-02-02 RX ORDER — OXYCODONE HYDROCHLORIDE AND ACETAMINOPHEN 5; 325 MG/1; MG/1
1 TABLET ORAL PRN
Status: COMPLETED | OUTPATIENT
Start: 2022-02-02 | End: 2022-02-02

## 2022-02-02 RX ORDER — SODIUM CHLORIDE, SODIUM LACTATE, POTASSIUM CHLORIDE, CALCIUM CHLORIDE 600; 310; 30; 20 MG/100ML; MG/100ML; MG/100ML; MG/100ML
INJECTION, SOLUTION INTRAVENOUS CONTINUOUS PRN
Status: DISCONTINUED | OUTPATIENT
Start: 2022-02-02 | End: 2022-02-02 | Stop reason: SDUPTHER

## 2022-02-02 RX ORDER — ONDANSETRON 2 MG/ML
4 INJECTION INTRAMUSCULAR; INTRAVENOUS EVERY 10 MIN PRN
Status: DISCONTINUED | OUTPATIENT
Start: 2022-02-02 | End: 2022-02-02 | Stop reason: HOSPADM

## 2022-02-02 RX ORDER — ONDANSETRON 2 MG/ML
INJECTION INTRAMUSCULAR; INTRAVENOUS PRN
Status: DISCONTINUED | OUTPATIENT
Start: 2022-02-02 | End: 2022-02-02 | Stop reason: SDUPTHER

## 2022-02-02 RX ORDER — PROPOFOL 10 MG/ML
INJECTION, EMULSION INTRAVENOUS PRN
Status: DISCONTINUED | OUTPATIENT
Start: 2022-02-02 | End: 2022-02-02 | Stop reason: SDUPTHER

## 2022-02-02 RX ORDER — SODIUM CHLORIDE 0.9 % (FLUSH) 0.9 %
10 SYRINGE (ML) INJECTION PRN
Status: DISCONTINUED | OUTPATIENT
Start: 2022-02-02 | End: 2022-02-02 | Stop reason: HOSPADM

## 2022-02-02 RX ORDER — APREPITANT 40 MG/1
40 CAPSULE ORAL ONCE
Status: COMPLETED | OUTPATIENT
Start: 2022-02-02 | End: 2022-02-02

## 2022-02-02 RX ORDER — BUPIVACAINE HYDROCHLORIDE AND EPINEPHRINE 5; 5 MG/ML; UG/ML
INJECTION, SOLUTION PERINEURAL PRN
Status: DISCONTINUED | OUTPATIENT
Start: 2022-02-02 | End: 2022-02-02 | Stop reason: ALTCHOICE

## 2022-02-02 RX ORDER — APREPITANT 40 MG/1
CAPSULE ORAL
Status: COMPLETED
Start: 2022-02-02 | End: 2022-02-02

## 2022-02-02 RX ORDER — GLYCOPYRROLATE 0.2 MG/ML
INJECTION INTRAMUSCULAR; INTRAVENOUS PRN
Status: DISCONTINUED | OUTPATIENT
Start: 2022-02-02 | End: 2022-02-02 | Stop reason: SDUPTHER

## 2022-02-02 RX ORDER — LIDOCAINE HYDROCHLORIDE 20 MG/ML
INJECTION, SOLUTION EPIDURAL; INFILTRATION; INTRACAUDAL; PERINEURAL PRN
Status: DISCONTINUED | OUTPATIENT
Start: 2022-02-02 | End: 2022-02-02 | Stop reason: SDUPTHER

## 2022-02-02 RX ORDER — FENTANYL CITRATE 50 UG/ML
INJECTION, SOLUTION INTRAMUSCULAR; INTRAVENOUS PRN
Status: DISCONTINUED | OUTPATIENT
Start: 2022-02-02 | End: 2022-02-02 | Stop reason: SDUPTHER

## 2022-02-02 RX ORDER — OXYCODONE HYDROCHLORIDE AND ACETAMINOPHEN 5; 325 MG/1; MG/1
0.5 TABLET ORAL EVERY 4 HOURS PRN
Qty: 10 TABLET | Refills: 0 | Status: SHIPPED | OUTPATIENT
Start: 2022-02-02 | End: 2022-02-07

## 2022-02-02 RX ORDER — SODIUM CHLORIDE 0.9 % (FLUSH) 0.9 %
10 SYRINGE (ML) INJECTION EVERY 12 HOURS SCHEDULED
Status: DISCONTINUED | OUTPATIENT
Start: 2022-02-02 | End: 2022-02-02 | Stop reason: HOSPADM

## 2022-02-02 RX ORDER — SULFAMETHOXAZOLE AND TRIMETHOPRIM 400; 80 MG/1; MG/1
1 TABLET ORAL 2 TIMES DAILY
Qty: 8 TABLET | Refills: 0 | Status: SHIPPED | OUTPATIENT
Start: 2022-02-02 | End: 2022-02-06

## 2022-02-02 RX ADMIN — FENTANYL CITRATE 50 MCG: 50 INJECTION INTRAMUSCULAR; INTRAVENOUS at 16:46

## 2022-02-02 RX ADMIN — MIDAZOLAM HYDROCHLORIDE 2 MG: 1 INJECTION, SOLUTION INTRAMUSCULAR; INTRAVENOUS at 14:08

## 2022-02-02 RX ADMIN — LIDOCAINE HYDROCHLORIDE 60 MG: 20 INJECTION, SOLUTION EPIDURAL; INFILTRATION; INTRACAUDAL; PERINEURAL at 16:18

## 2022-02-02 RX ADMIN — APREPITANT 40 MG: 40 CAPSULE ORAL at 12:54

## 2022-02-02 RX ADMIN — SODIUM CHLORIDE, POTASSIUM CHLORIDE, SODIUM LACTATE AND CALCIUM CHLORIDE: 600; 310; 30; 20 INJECTION, SOLUTION INTRAVENOUS at 12:52

## 2022-02-02 RX ADMIN — HYDROMORPHONE HYDROCHLORIDE 0.5 MG: 1 INJECTION, SOLUTION INTRAMUSCULAR; INTRAVENOUS; SUBCUTANEOUS at 18:03

## 2022-02-02 RX ADMIN — FENTANYL CITRATE 50 MCG: 50 INJECTION INTRAMUSCULAR; INTRAVENOUS at 16:24

## 2022-02-02 RX ADMIN — SODIUM CHLORIDE, POTASSIUM CHLORIDE, SODIUM LACTATE AND CALCIUM CHLORIDE: 600; 310; 30; 20 INJECTION, SOLUTION INTRAVENOUS at 17:19

## 2022-02-02 RX ADMIN — HYDROMORPHONE HYDROCHLORIDE 0.5 MG: 1 INJECTION, SOLUTION INTRAMUSCULAR; INTRAVENOUS; SUBCUTANEOUS at 17:46

## 2022-02-02 RX ADMIN — GLYCOPYRROLATE 0.2 MG: 0.2 INJECTION, SOLUTION INTRAMUSCULAR; INTRAVENOUS at 16:28

## 2022-02-02 RX ADMIN — Medication 100 MCG: at 16:35

## 2022-02-02 RX ADMIN — DEXAMETHASONE SODIUM PHOSPHATE 8 MG: 4 INJECTION, SOLUTION INTRAMUSCULAR; INTRAVENOUS at 16:18

## 2022-02-02 RX ADMIN — SODIUM CHLORIDE, POTASSIUM CHLORIDE, SODIUM LACTATE AND CALCIUM CHLORIDE: 600; 310; 30; 20 INJECTION, SOLUTION INTRAVENOUS at 16:45

## 2022-02-02 RX ADMIN — OXYCODONE HYDROCHLORIDE AND ACETAMINOPHEN 2 TABLET: 5; 325 TABLET ORAL at 18:17

## 2022-02-02 RX ADMIN — SODIUM CHLORIDE, POTASSIUM CHLORIDE, SODIUM LACTATE AND CALCIUM CHLORIDE: 600; 310; 30; 20 INJECTION, SOLUTION INTRAVENOUS at 15:25

## 2022-02-02 RX ADMIN — ONDANSETRON HYDROCHLORIDE 4 MG: 2 INJECTION, SOLUTION INTRAMUSCULAR; INTRAVENOUS at 16:18

## 2022-02-02 RX ADMIN — PROPOFOL 200 MG: 10 INJECTION, EMULSION INTRAVENOUS at 16:18

## 2022-02-02 RX ADMIN — HYDROMORPHONE HYDROCHLORIDE 0.5 MG: 1 INJECTION, SOLUTION INTRAMUSCULAR; INTRAVENOUS; SUBCUTANEOUS at 17:52

## 2022-02-02 RX ADMIN — FENTANYL CITRATE 50 MCG: 50 INJECTION INTRAMUSCULAR; INTRAVENOUS at 16:28

## 2022-02-02 RX ADMIN — MIDAZOLAM 2 MG: 1 INJECTION INTRAMUSCULAR; INTRAVENOUS at 16:13

## 2022-02-02 RX ADMIN — Medication 2000 MG: at 16:24

## 2022-02-02 RX ADMIN — FENTANYL CITRATE 50 MCG: 50 INJECTION INTRAMUSCULAR; INTRAVENOUS at 17:06

## 2022-02-02 ASSESSMENT — PAIN DESCRIPTION - PAIN TYPE
TYPE: SURGICAL PAIN

## 2022-02-02 ASSESSMENT — PAIN SCALES - GENERAL
PAINLEVEL_OUTOF10: 9

## 2022-02-02 ASSESSMENT — PULMONARY FUNCTION TESTS
PIF_VALUE: 2
PIF_VALUE: 1
PIF_VALUE: 2
PIF_VALUE: 3
PIF_VALUE: 0
PIF_VALUE: 2
PIF_VALUE: 2
PIF_VALUE: 3
PIF_VALUE: 2
PIF_VALUE: 2
PIF_VALUE: 16
PIF_VALUE: 3
PIF_VALUE: 2
PIF_VALUE: 20
PIF_VALUE: 2
PIF_VALUE: 3
PIF_VALUE: 2
PIF_VALUE: 18
PIF_VALUE: 2
PIF_VALUE: 1
PIF_VALUE: 16
PIF_VALUE: 2
PIF_VALUE: 10
PIF_VALUE: 2
PIF_VALUE: 4
PIF_VALUE: 18
PIF_VALUE: 3
PIF_VALUE: 1
PIF_VALUE: 2
PIF_VALUE: 2
PIF_VALUE: 3
PIF_VALUE: 30
PIF_VALUE: 2
PIF_VALUE: 3
PIF_VALUE: 2
PIF_VALUE: 1
PIF_VALUE: 2
PIF_VALUE: 17
PIF_VALUE: 13
PIF_VALUE: 19
PIF_VALUE: 2
PIF_VALUE: 2
PIF_VALUE: 19
PIF_VALUE: 10
PIF_VALUE: 2
PIF_VALUE: 3
PIF_VALUE: 2
PIF_VALUE: 20
PIF_VALUE: 2
PIF_VALUE: 2
PIF_VALUE: 3
PIF_VALUE: 20
PIF_VALUE: 3
PIF_VALUE: 2
PIF_VALUE: 1
PIF_VALUE: 2

## 2022-02-02 ASSESSMENT — PAIN DESCRIPTION - DESCRIPTORS
DESCRIPTORS: STABBING

## 2022-02-02 ASSESSMENT — LIFESTYLE VARIABLES: SMOKING_STATUS: 1

## 2022-02-02 ASSESSMENT — PAIN DESCRIPTION - LOCATION
LOCATION: ABDOMEN

## 2022-02-02 ASSESSMENT — PAIN - FUNCTIONAL ASSESSMENT: PAIN_FUNCTIONAL_ASSESSMENT: 0-10

## 2022-02-02 NOTE — PROGRESS NOTES
Patient discharged via wheelchair in stable condition with all belongings to private car. Leopold Jenkins, RN Estimated Blood Loss (Cc): minimal

## 2022-02-02 NOTE — BRIEF OP NOTE
Brief Postoperative Note      Patient: Sophie Eason  YOB: 1985  MRN: [id number]    Date of Procedure: 2/2/2022    Pre-Op Diagnosis: STRESS INCONTINENCE    Post-Op Diagnosis: Same       Procedure(s):  CYSTOSCOPY, CYSTOCELE REPAIR    Surgeon(s):  Deborah Hooks MD    Assistant:  Surgical Assistant: Deepa Oconnor    Anesthesia: General    Estimated Blood Loss (mL): Minimal    Complications: None    Specimens:   * No specimens in log *    Implants:  Implant Name Type Inv. Item Serial No.  Lot No. LRB No. Used Action   SLING GYN POLYPR TRNSVAG MIDURETHRAL MESH DEL DEV FOR  SLING GYN POLYPR TRNSVAG MIDURETHRAL MESH DEL DEV FOR  [facility]- [id number] N/A 1 Implanted         Drains: * No LDAs found *    Findings: Cystocele, vesicovaginal fistula posterior. TVT was not placed secondary to new diagnoses of vesicovaginal fistula. PLAN:  She will need to be evaluated for possible robotic repair of VVF.     Electronically signed by Sherri Elizalde MD on 2/2/2022 at 5:36 PM

## 2022-02-02 NOTE — PROGRESS NOTES
Discharge instructions given to patient and patients boyfriend. Both deny any questions at this time. Osmani Presley RN

## 2022-02-02 NOTE — PROGRESS NOTES
Patient admitted from OR to PACU. Bedside report received. Patient immediately hooked up to heart monitor. Pipe Rollins RN

## 2022-02-02 NOTE — H&P
Dr. Alberto Kim Same Day Surgery H&P     2/2/2022  [facility]    Reason for Surgery:  Mixed incontinence, pelvic prolapse, cystocele  Requesting Physician:  ER/Soto Mendez      History Obtained From:  patient, electronic medical record    HISTORY OF PRESENT ILLNESS:                The patient is a 43 y.o. female who presents with incontinence, pelvic prolapse and pelvic pain. She has failed conservative and medical management. She has also failed with cystoscopy and dilation. The patient has requested to have her bladder lifted to help with incontinence. I am taking the patient to surgery for evaluation and care of this problem. Past Medical History:        Diagnosis Date    Hepatitis C     Other disorders of kidney and ureter      Past Surgical History:        Procedure Laterality Date    CYSTOSCOPY      stents    ENDOSCOPY, COLON, DIAGNOSTIC      HYSTERECTOMY N/A 9/23/2019    HYSTERECTOMY ABDOMINAL LAPAROSCOPIC ROBOTIC XI BILATERAL SALPINGECTOMY WITH CYSTOSCOPY performed by Mirian Graham MD at [facility]       Current Medications:   Prior to Admission medications    Medication Sig Start Date End Date Taking?  Authorizing Provider   venlafaxine (EFFEXOR XR) 150 MG extended release capsule Take 150 mg by mouth daily   Yes Historical Provider, MD   paliperidone palmitate ER (INVEGA SUSTENNA) 117 MG/0.75ML FLORESITA IM injection Inject 117 mg into the muscle every 30 days   Yes Historical Provider, MD   Buprenorphine HCl (SUBUTEX SL) Place 16 mg under the tongue daily   Yes Historical Provider, MD   QUEtiapine (SEROQUEL XR) 50 MG extended release tablet Take 50 mg by mouth nightly   Yes Historical Provider, MD   dicyclomine (BENTYL) 10 MG capsule Take 2 capsules by mouth every 6 hours as needed (cramps) 9/2/20 12/16/20  SUZANNA Keyes - CNP   ketorolac (TORADOL) 10 MG tablet Take 1 tablet by mouth every 6 hours as needed for Pain 9/24/19 12/16/20  Enrrique Theodore MD   Sucralfate (CARAFATE PO) Take by mouth 4 times daily  1/31/22  Historical Provider, MD     Allergies:  No Known Allergies  Social History:  Reviewed, non contributory    Family History:  Reviewed, non contributory      REVIEW OF SYSTEMS:    12 point ROS was already completed and this has been reviewed. The pertinent positive findings include mixed incontinence and pelvic discomfort. PHYSICAL EXAM:    VITALS:  /67   Pulse 97   Temp 97.4 °F (36.3 °C) (Infrared)   Resp 16   Ht 4' 11\" (1.499 m)   Wt 140 lb (63.5 kg)   LMP 09/03/2019   SpO2 94%   BMI 28.28 kg/m²   H&N: Sclera normal, no masses, trachea midline, no bruit  CVS: Normal rate and rhythm, no murmurs or rubs, peripheral pulses equal, no clubbing or cyanosis. RESP: Breath sounds equal bilateral, few rhonchi. ABDO: Soft, non-tender, bowel sounds active, no organomegaly, no hernias. LYMPH:  No lymphadenopathy. Skin: Warm dry and intact. : No CVAT, normal external genitalia, no discharge. MSK: Grossly normal for patient  DEBRA: Grossly normal for patient  PSY: No acute changes noted in psychosocial assessment.      DATA:    CBC:   Lab Results   Component Value Date    WBC 14.2 08/26/2021    RBC 4.69 08/26/2021    HGB 14.5 08/26/2021    HCT 42.0 08/26/2021    MCV 89.5 08/26/2021    MCH 30.8 08/26/2021    MCHC 34.5 08/26/2021    RDW 13.5 08/26/2021     08/26/2021    MPV 8.3 08/26/2021     CMP:    Lab Results   Component Value Date     08/26/2021    K 3.5 08/26/2021     08/26/2021    CO2 19 08/26/2021    BUN 10 08/26/2021    CREATININE <0.5 08/26/2021    GFRAA >60 08/26/2021    GFRAA >60 06/20/2011    AGRATIO 1.5 12/16/2020    LABGLOM >60 08/26/2021    GLUCOSE 98 08/26/2021    PROT 8.3 08/26/2021    PROT 6.9 06/20/2011    LABALBU 4.5 08/26/2021    CALCIUM 10.1 08/26/2021    BILITOT 0.5 08/26/2021    ALKPHOS 86 08/26/2021    AST 22 08/26/2021    ALT 13 08/26/2021     U/A:    Lab Results Component Value Date    COLORU Yellow 08/26/2021    PROTEINU 30 08/26/2021    PHUR 6.0 08/26/2021    PHUR 6.0 08/26/2021    WBCUA 3-5 08/26/2021    RBCUA 3-4 08/26/2021    MUCUS 2+ 08/26/2021    BACTERIA 2+ 08/26/2021    CLARITYU Clear 08/26/2021    SPECGRAV >=1.030 08/26/2021    LEUKOCYTESUR TRACE 08/26/2021    UROBILINOGEN 0.2 08/26/2021    BILIRUBINUR Negative 08/26/2021    BILIRUBINUR NEGATIVE 06/20/2011    BLOODU SMALL 08/26/2021    GLUCOSEU Negative 08/26/2021    GLUCOSEU NEGATIVE 06/20/2011       IMPRESSION/RECOMMENDATIONS:   Patient will be taken to surgery for definitive care for the presenting problems. The patient has been informed of the goals, risks and benefits of the procedure. These include pelvic pain, urgency, dyspareunia and incontinence. Informed consent has been obtained and all of the patient's questions were answered to their satisfaction. She understands the goals of this surgery are to improve her bladder function and to help with her incontinence issues related to OSVALDO. The patient wishes to proceed with the planned surgery.       Delia Edmondson MD, M.D.

## 2022-02-02 NOTE — ANESTHESIA PRE PROCEDURE
Department of Anesthesiology  Preprocedure Note       Name:  Moon Chavez   Age:  43 y.o.  :  1985                                          MRN:  [id number]         Date:  2022      Surgeon: Glynn Smith):  Mauro Wade MD    Procedure: Procedure(s):  CYSTOSCOPY, TRANS VAGINAL TAPING, CYSTOCELE REPAIR    Medications prior to admission:   Prior to Admission medications    Medication Sig Start Date End Date Taking?  Authorizing Provider   venlafaxine (EFFEXOR XR) 150 MG extended release capsule Take 150 mg by mouth daily   Yes Historical Provider, MD   paliperidone palmitate ER (INVEGA SUSTENNA) 117 MG/0.75ML FLORESITA IM injection Inject 117 mg into the muscle every 30 days   Yes Historical Provider, MD   Buprenorphine HCl (SUBUTEX SL) Place 16 mg under the tongue daily   Yes Historical Provider, MD   QUEtiapine (SEROQUEL XR) 50 MG extended release tablet Take 50 mg by mouth nightly   Yes Historical Provider, MD   dicyclomine (BENTYL) 10 MG capsule Take 2 capsules by mouth every 6 hours as needed (cramps) 20  SUZANNA Pires - CNP   ketorolac (TORADOL) 10 MG tablet Take 1 tablet by mouth every 6 hours as needed for Pain 19  Kristal Encarnacion MD   Sucralfate (CARAFATE PO) Take by mouth 4 times daily  22  Historical Provider, MD       Current medications:    Current Facility-Administered Medications   Medication Dose Route Frequency Provider Last Rate Last Admin    lactated ringers infusion   IntraVENous Continuous Prabhjot Wallis  mL/hr at 22 1252 New Bag at 22 1252    sodium chloride flush 0.9 % injection 10 mL  10 mL IntraVENous 2 times per day Prabhjot Wallis MD        sodium chloride flush 0.9 % injection 10 mL  10 mL IntraVENous PRN Prabhjot Wallis MD        0.9 % sodium chloride infusion  25 mL IntraVENous PRN Prabhjot Wallis MD        lidocaine PF 1 % injection 1 mL  1 mL IntraDERmal Once PRN MD Danny Venegas ceFAZolin (ANCEF) 2000 mg in sterile water 20 mL IV syringe  2,000 mg IntraVENous Once Gilberto Ojeda MD           Allergies:  No Known Allergies    Problem List:    Patient Active Problem List   Diagnosis Code    Methamphetamine use disorder, moderate (HCC) F15.20    Mood disorder ([facility] [address] F39    Pyelonephritis, acute N10    Chronic hepatitis C without hepatic coma ([facility] [address] B18.2    Lesion of liver K76.9    Sepsis due to gram-negative UTI (HCC) A41.50, N39.0       Past Medical History:        Diagnosis Date    Hepatitis C     Other disorders of kidney and ureter        Past Surgical History:        Procedure Laterality Date    CYSTOSCOPY      stents    ENDOSCOPY, COLON, DIAGNOSTIC      HYSTERECTOMY N/A 9/23/2019    HYSTERECTOMY ABDOMINAL LAPAROSCOPIC ROBOTIC XI BILATERAL SALPINGECTOMY WITH CYSTOSCOPY performed by Blayne Rubin MD at [address] [place] [address]         Social History:    Social History     Tobacco Use    Smoking status: Current Every Day Smoker     Packs/day: 0.75     Types: Cigarettes    Smokeless tobacco: Never Used   Substance Use Topics    Alcohol use:  No                                Ready to quit: Not Answered  Counseling given: Not Answered      Vital Signs (Current):   Vitals:    01/31/22 1026 02/02/22 1245   BP:  116/67   Pulse:  97   Resp:  16   Temp:  97.4 °F (36.3 °C)   TempSrc:  Infrared   SpO2:  94%   Weight: 143 lb (64.9 kg) 140 lb (63.5 kg)   Height: 4' 11\" (1.499 m) 4' 11\" (1.499 m)                                              BP Readings from Last 3 Encounters:   02/02/22 116/67   08/26/21 125/86   12/16/20 118/69       NPO Status: Time of last liquid consumption: 0000                        Time of last solid consumption: 0000                        Date of last liquid consumption: 02/01/22                        Date of last solid food consumption: 02/01/22    BMI:   Wt Readings from Last 3 Encounters:   02/02/22 140 lb (63.5 kg)   08/26/21 115 lb (52.2 kg) 12/16/20 103 lb (46.7 kg)     Body mass index is 28.28 kg/m². CBC:   Lab Results   Component Value Date    WBC 14.2 08/26/2021    RBC 4.69 08/26/2021    HGB 14.5 08/26/2021    HCT 42.0 08/26/2021    MCV 89.5 08/26/2021    RDW 13.5 08/26/2021     08/26/2021       CMP:   Lab Results   Component Value Date     08/26/2021    K 3.5 08/26/2021     08/26/2021    CO2 19 08/26/2021    BUN 10 08/26/2021    CREATININE <0.5 08/26/2021    GFRAA >60 08/26/2021    GFRAA >60 06/20/2011    AGRATIO 1.5 12/16/2020    LABGLOM >60 08/26/2021    GLUCOSE 98 08/26/2021    PROT 8.3 08/26/2021    PROT 6.9 06/20/2011    CALCIUM 10.1 08/26/2021    BILITOT 0.5 08/26/2021    ALKPHOS 86 08/26/2021    AST 22 08/26/2021    ALT 13 08/26/2021       POC Tests: No results for input(s): POCGLU, POCNA, POCK, POCCL, POCBUN, POCHEMO, POCHCT in the last 72 hours.     Coags: No results found for: PROTIME, INR, APTT    HCG (If Applicable):   Lab Results   Component Value Date    PREGTESTUR Negative 08/26/2021        ABGs: No results found for: PHART, PO2ART, FQC7JWM, UYT8KNP, BEART, M1ANFKCA     Type & Screen (If Applicable):  No results found for: LABABO, LABRH    Drug/Infectious Status (If Applicable):  No results found for: HIV, HEPCAB    COVID-19 Screening (If Applicable):   Lab Results   Component Value Date    COVID19 Not Detected 01/31/2022           Anesthesia Evaluation  Patient summary reviewed and Nursing notes reviewed no history of anesthetic complications:   Airway: Mallampati: II  TM distance: >3 FB   Neck ROM: full  Mouth opening: > = 3 FB Dental: normal exam         Pulmonary:   (+) current smoker                           Cardiovascular:Negative CV ROS                      Neuro/Psych:   (+) psychiatric history (h/o methamphetamine abuse):            GI/Hepatic/Renal:   (+) hepatitis: C, liver disease:,      (-) GERD and no renal disease       Endo/Other: Negative Endo/Other ROS       (-) diabetes mellitus               Abdominal:             Vascular: negative vascular ROS. Other Findings:           Anesthesia Plan      general     ASA 3     (I discussed with the patient the risks and benefits of PIV, general anesthesia, IV Narcotics, PACU. All questions were answered the patient agrees with the plan)  Induction: intravenous. MIPS: Prophylactic antiemetics administered. Anesthetic plan and risks discussed with patient. Plan discussed with CRNA.                   Chhaya Barillas MD   2/2/2022

## 2022-02-03 NOTE — OP NOTE
[address]                 [address] [address]                                OPERATIVE REPORT    PATIENT NAME: Berenice Colón                     :        1985  MED REC NO:   [id number]                          ROOM:  ACCOUNT NO:   [de-identified]                           ADMIT DATE: 2022  PROVIDER:     Eamon Salugero MD    DATE OF PROCEDURE:  2022    PREOPERATIVE DIAGNOSES:  Stress urinary incontinence and pelvic  prolapse. POSTOPERATIVE DIAGNOSES:  1. Stress urinary incontinence. 2.  Pelvic prolapse. 3.  Vesicovaginal fistula. OPERATIONS PERFORMED:  1. Cystocele repair. 2.  Attempted transvaginal tape placement. 3.  Cystourethroscopy with evaluation and recognition of small  vesicovaginal fistula. PRIMARY SURGEON:  Eamon Salguero MD    ANESTHESIA:  General.    ESTIMATED BLOOD LOSS:  100 mL. HISTORY AND INDICATIONS:  This is a 68-year-old white female, who has a  history dating back to  of mixed incontinence and voiding  difficulty. She has had a hysterectomy at that time. She was evaluated  later that year and the thought process was possible vesicovaginal  fistula. She has undergone a cystoscopy and no significant lesion had  been noted, and as well urodynamics. Urodynamics had shown decreased  sensation and stress incontinence. She had recently been in the office  complaining of persistent incontinence and urgency, frequency, as well  as pelvic discomfort. Options have been discussed with the patient  concerning the use of a bladder suspension procedure and repair of  cystocele. Risks, benefits, and expected outcomes of the procedure have  been discussed in detail in preoperative consultation. PROCEDURE IN DETAIL:  After obtaining informed consent, the patient was  taken to the operative suite. She was given a general anesthetic and  intravenous antibiotics.   Once adequately anesthetized, she was placed  on the operative table in a modified dorsal lithotomy position. Pelvic  exam was done, and there was some urine at the introitus, but after  drying the vaginal canal, I pressed on the patient's bladder and no  urine was able to be identified. Prepping and draping were then done  including vaginal prep in a sterile fashion. At this point, labial retraction sutures were placed followed by  weighted vaginal speculum and Quiroz catheter. The anterior vaginal wall  was infiltrated with Marcaine and epinephrine solution. A midline  incision was created to expose the undersurface of the pubocervical  ligaments in a pelvic fashion. Further dissection was carried out in  and around the bladder neck. Irrigation with antibiotic solution was  used throughout the dissection. After completing the dissection out  laterally, the lateral fascial tissue was then brought into the midline  by pexing it there and imbricating the base of the bladder using a  combination of 3-0 and 2-0 Vicryl sutures. After this had been  completed, further dissection of bladder neck area was done, and using a  catheter on a straight catheter guide, the bladder neck was retracted  contralateral.  As this was done, a curved trocar was placed on either  side of the bladder neck and gently track up the undersurface of the  pubic bone through a separate puncture site each in the lower abdomen. This maneuver was completed on the opposite contralateral side again  retracting the bladder neck with the Quiroz and catheter guide. With the  arms of the sling in position, a repeat cystourethroscopy was performed. The ureters themselves had good efflux of urine. Reevaluation of the  bladder at this point revealed a small nipple in the posterior part of  the bladder. I further evaluated this with a 70-degree lens.   With the  bladder on distention, I was able to demonstrate a small leak into the  vaginal canal.  This was consistent with a vesicovaginal fistula. At this point, I decided not to proceed forward with the TVT placement. The _____ on the arms were removed and all foreign bodies were removed  from the patient's vaginal canal.  Again, further evaluation with the  bladder fold showed some fluid collecting in the apex of the vaginal  vault. This was consistent with vesicovaginal fistula. Further  irrigation was done of the repaired cystocele and the vaginal epithelium  was then reapproximated with 2-0 Vicryl sutures in interrupted vertical  mattress suture style. Quiroz catheter was then reinserted to decompress  the patient's bladder. Vaginal packing was placed. Overall, the patient tolerated the procedure well. She was taken back  to the postop recovery room in stable condition. I had a lengthy  discussion with the patient and as well her significant friend  concerning these operative findings. She will likely require a repair  of the vesicovaginal fistula, which may be approached through a robotic  technique. The patient will be referred to the appropriate institution  of surgeon for this repair. She will be followed up in the office this  coming week for further discussion, evaluation, and planning of her  possible upcoming surgery to repair the fistula that has now been  diagnosed.         Mathieu Mckeon MD    D: 02/03/2022 8:38:22       T: 02/03/2022 8:42:31     [email or web address]  Job#: [id number]     Doc#: [id number]    CC:

## 2022-03-16 RX ORDER — ALBUTEROL SULFATE 90 UG/1
2 AEROSOL, METERED RESPIRATORY (INHALATION) EVERY 4 HOURS PRN
COMMUNITY

## 2022-03-16 NOTE — PROGRESS NOTES
Covid testing to be done: 3/26/2022 AT [facility]    If positive---Pt instructed to notify MD ASAP  If negative--pt was instructed to bring Hard copy of Covid results DOP/DOS    Preoperative Screening for Elective Surgery/Invasive Procedures While COVID-19 present in the community     1. Have you tested positive or have been told to self-isolate for COVID-19 like symptoms within the past 28 days? NO  2. Do you currently have any of the following symptoms? NO  ? Fever >100.0 F or 99.9 F in immunocompromised patients? NO  ? New onset cough, shortness of breath or difficulty breathing? NO  ? New onset sore throat, myalgia (muscle aches and pains), headache, loss of taste/smell or diarrhea? NO  3. Have you had a potential exposure to COVID-19 within the past 14 days by: NO  ? Close contact with a confirmed case? NO  ? Close contact with a healthcare worker,  or essential infrastructure worker (grocery store, [employer], gas station, public utilities or transportation)? NO  ? Do you reside in a congregate setting such as; skilled nursing facility, adult home, correctional facility, homeless shelter or other institutional setting? NO  ? Have you had recent travel to a known COVID-19 hotspot? NO     Indicate if the patient has a positive screen by answering yes to one or more of the above questions. If patient is unable to obtain a COVID test prior to DOS/DOP will need RAPID DOS. C-Difficile admission screening and protocol:       * Admitted with diarrhea? [] YES    [x]  NO     *Prior history of C-Diff. In last 3 months? [] YES    [x]  NO     *Antibiotic use in the past 6-8 weeks? []  NO    [x]  YES      If yes, which: REASON____KEFLEX-UTI_____________     *Prior hospitalization or nursing home in the last month? []  YES    [x]  NO     SAFETY FIRST. .call before you fall    [address] [facility] time__11AM__________        Surgery before going to operating room. For your convenience, we will provide you with a container. If you wear contact lenses or glasses, they will be removed, please bring a case for them. If you have a living will and a durable power of  for healthcare, please bring in a copy. As part of our patient safety program to minimize surgical site infections, we ask you to do the following:    · Please notify your surgeon if you develop any illness between         now and the day of your surgery. · This includes a cough, cold, fever, sore throat, nausea,         or vomiting, and diarrhea, etc.  ·  Please notify your surgeon if you experience dizziness, shortness         of breath or blurred vision between now and the time of your surgery. Do not shave your operative site 96 hours prior to surgery. For face and neck surgery, men may use an electric razor 48 hours   prior to surgery. You may shower the night before surgery or the morning of   your surgery with an antibacterial soap. You will need to bring a photo ID and insurance card     If you use a C-pap or Bi-pap machine, please bring your machine with you to the hospital     Our goal is to provide you with excellent care, therefore, visitors will be limited to so that we may focus on providing this care for you. Please contact your surgeon office, if you have any further questions. [facility] phone number:  [phone] PAT fax number:  [phone]    Please note these are generalized instructions for all surgical cases, you may be provided with more specific instructions according to your surgery.

## 2022-03-16 NOTE — PROGRESS NOTES
DOS 3/31/2022    PATIENT HAS APPOINTMENT AT [facility] FOR PRE-OP H&P-PHONE # [phone]    PATIENT HAS PRE-ADMISSION TESTING ON 3/26/2022 FOR LABS AND COVID-PLEASE REVIEW AND MAKE SURE ALL LABS ARE DONE

## 2022-03-26 ENCOUNTER — HOSPITAL ENCOUNTER (OUTPATIENT)
Dept: PREADMISSION TESTING | Age: 37
Discharge: HOME | End: 2022-03-30
Payer: MEDICAID

## 2022-03-26 DIAGNOSIS — Z01.818 ENCOUNTER FOR PREADMISSION TESTING: ICD-10-CM

## 2022-03-26 LAB
ABO/RH: NORMAL
ANION GAP SERPL CALCULATED.3IONS-SCNC: 12 MMOL/L (ref 3–16)
ANTIBODY SCREEN: NORMAL
APTT: 49.2 SEC (ref 26.2–38.6)
BUN BLDV-MCNC: 7 MG/DL (ref 7–20)
CALCIUM SERPL-MCNC: 9.2 MG/DL (ref 8.3–10.6)
CHLORIDE BLD-SCNC: 104 MMOL/L (ref 99–110)
CO2: 24 MMOL/L (ref 21–32)
CREAT SERPL-MCNC: 0.5 MG/DL (ref 0.6–1.1)
GFR AFRICAN AMERICAN: >60
GFR NON-AFRICAN AMERICAN: >60
GLUCOSE BLD-MCNC: 85 MG/DL (ref 70–99)
HCT VFR BLD CALC: 38.6 % (ref 36–48)
HEMOGLOBIN: 13 G/DL (ref 12–16)
INR BLD: 0.96 (ref 0.88–1.12)
MCH RBC QN AUTO: 28.5 PG (ref 26–34)
MCHC RBC AUTO-ENTMCNC: 33.6 G/DL (ref 31–36)
MCV RBC AUTO: 84.7 FL (ref 80–100)
PDW BLD-RTO: 13.9 % (ref 12.4–15.4)
PLATELET # BLD: 261 K/UL (ref 135–450)
PMV BLD AUTO: 7.9 FL (ref 5–10.5)
POTASSIUM SERPL-SCNC: 4.4 MMOL/L (ref 3.5–5.1)
PROTHROMBIN TIME: 10.8 SEC (ref 9.9–12.7)
RBC # BLD: 4.56 M/UL (ref 4–5.2)
SARS-COV-2, NAAT: NOT DETECTED
SODIUM BLD-SCNC: 140 MMOL/L (ref 136–145)
WBC # BLD: 8.1 K/UL (ref 4–11)

## 2022-03-26 PROCEDURE — 85610 PROTHROMBIN TIME: CPT

## 2022-03-26 PROCEDURE — 86850 RBC ANTIBODY SCREEN: CPT

## 2022-03-26 PROCEDURE — 80048 BASIC METABOLIC PNL TOTAL CA: CPT

## 2022-03-26 PROCEDURE — 85730 THROMBOPLASTIN TIME PARTIAL: CPT

## 2022-03-26 PROCEDURE — 87635 SARS-COV-2 COVID-19 AMP PRB: CPT

## 2022-03-26 PROCEDURE — 86900 BLOOD TYPING SEROLOGIC ABO: CPT

## 2022-03-26 PROCEDURE — 86901 BLOOD TYPING SEROLOGIC RH(D): CPT

## 2022-03-26 PROCEDURE — 85027 COMPLETE CBC AUTOMATED: CPT

## 2022-03-30 ENCOUNTER — ANESTHESIA EVENT (OUTPATIENT)
Dept: OPERATING ROOM | Age: 37
End: 2022-03-30
Payer: MEDICAID

## 2022-03-30 NOTE — PROGRESS NOTES
Jose Raul Abad. Orab spoke with Amelia Aw she will fax H&P to PAT. Routed abnormal aPTT to dr Hernandez`s office.

## 2022-03-31 ENCOUNTER — ANESTHESIA (OUTPATIENT)
Dept: OPERATING ROOM | Age: 37
End: 2022-03-31
Payer: MEDICAID

## 2022-03-31 ENCOUNTER — HOSPITAL ENCOUNTER (OUTPATIENT)
Age: 37
LOS: 1 days | Discharge: HOME | End: 2022-04-01
Attending: UROLOGY | Admitting: UROLOGY
Payer: MEDICAID

## 2022-03-31 VITALS
OXYGEN SATURATION: 87 % | TEMPERATURE: 94.8 F | RESPIRATION RATE: 24 BRPM | DIASTOLIC BLOOD PRESSURE: 77 MMHG | SYSTOLIC BLOOD PRESSURE: 134 MMHG

## 2022-03-31 DIAGNOSIS — N82.0 VESICOVAGINAL FISTULA: ICD-10-CM

## 2022-03-31 DIAGNOSIS — Z01.818 ENCOUNTER FOR PREADMISSION TESTING: Primary | ICD-10-CM

## 2022-03-31 LAB
ABO/RH: NORMAL
ANTIBODY SCREEN: NORMAL

## 2022-03-31 PROCEDURE — C1758 CATHETER, URETERAL: HCPCS | Performed by: UROLOGY

## 2022-03-31 PROCEDURE — 2580000003 HC RX 258: Performed by: UROLOGY

## 2022-03-31 PROCEDURE — 6360000002 HC RX W HCPCS

## 2022-03-31 PROCEDURE — 7100000000 HC PACU RECOVERY - FIRST 15 MIN: Performed by: UROLOGY

## 2022-03-31 PROCEDURE — 3600000019 HC SURGERY ROBOT ADDTL 15MIN: Performed by: UROLOGY

## 2022-03-31 PROCEDURE — A4217 STERILE WATER/SALINE, 500 ML: HCPCS | Performed by: UROLOGY

## 2022-03-31 PROCEDURE — 6360000002 HC RX W HCPCS: Performed by: UROLOGY

## 2022-03-31 PROCEDURE — 2709999900 HC NON-CHARGEABLE SUPPLY: Performed by: UROLOGY

## 2022-03-31 PROCEDURE — S2900 ROBOTIC SURGICAL SYSTEM: HCPCS | Performed by: UROLOGY

## 2022-03-31 PROCEDURE — 6370000000 HC RX 637 (ALT 250 FOR IP): Performed by: UROLOGY

## 2022-03-31 PROCEDURE — 6360000002 HC RX W HCPCS: Performed by: ANESTHESIOLOGY

## 2022-03-31 PROCEDURE — 2500000003 HC RX 250 WO HCPCS

## 2022-03-31 PROCEDURE — 3600000009 HC SURGERY ROBOT BASE: Performed by: UROLOGY

## 2022-03-31 PROCEDURE — 86901 BLOOD TYPING SEROLOGIC RH(D): CPT

## 2022-03-31 PROCEDURE — 86900 BLOOD TYPING SEROLOGIC ABO: CPT

## 2022-03-31 PROCEDURE — 7100000001 HC PACU RECOVERY - ADDTL 15 MIN: Performed by: UROLOGY

## 2022-03-31 PROCEDURE — 3700000001 HC ADD 15 MINUTES (ANESTHESIA): Performed by: UROLOGY

## 2022-03-31 PROCEDURE — C1762 CONN TISS, HUMAN(INC FASCIA): HCPCS | Performed by: UROLOGY

## 2022-03-31 PROCEDURE — C1769 GUIDE WIRE: HCPCS | Performed by: UROLOGY

## 2022-03-31 PROCEDURE — 86850 RBC ANTIBODY SCREEN: CPT

## 2022-03-31 PROCEDURE — 2580000003 HC RX 258: Performed by: ANESTHESIOLOGY

## 2022-03-31 PROCEDURE — 3700000000 HC ANESTHESIA ATTENDED CARE: Performed by: UROLOGY

## 2022-03-31 PROCEDURE — C2617 STENT, NON-COR, TEM W/O DEL: HCPCS | Performed by: UROLOGY

## 2022-03-31 DEVICE — ALLOGRAFT HUM TISS 6X3 CM CRYOPRESERVED AMNIOX CLARIX CRD 1K: Type: IMPLANTABLE DEVICE | Site: BLADDER | Status: FUNCTIONAL

## 2022-03-31 DEVICE — URETERAL STENT
Type: IMPLANTABLE DEVICE | Site: URETER | Status: FUNCTIONAL
Brand: CONTOUR™

## 2022-03-31 RX ORDER — OXYCODONE HYDROCHLORIDE 5 MG/1
5 TABLET ORAL EVERY 4 HOURS PRN
Status: DISCONTINUED | OUTPATIENT
Start: 2022-03-31 | End: 2022-04-01 | Stop reason: HOSPADM

## 2022-03-31 RX ORDER — KETAMINE HCL IN NACL, ISO-OSM 100MG/10ML
SYRINGE (ML) INJECTION PRN
Status: DISCONTINUED | OUTPATIENT
Start: 2022-03-31 | End: 2022-03-31 | Stop reason: SDUPTHER

## 2022-03-31 RX ORDER — SODIUM CHLORIDE 0.9 % (FLUSH) 0.9 %
10 SYRINGE (ML) INJECTION EVERY 12 HOURS SCHEDULED
Status: DISCONTINUED | OUTPATIENT
Start: 2022-03-31 | End: 2022-03-31 | Stop reason: HOSPADM

## 2022-03-31 RX ORDER — LIDOCAINE HYDROCHLORIDE 20 MG/ML
INJECTION, SOLUTION EPIDURAL; INFILTRATION; INTRACAUDAL; PERINEURAL PRN
Status: DISCONTINUED | OUTPATIENT
Start: 2022-03-31 | End: 2022-03-31 | Stop reason: SDUPTHER

## 2022-03-31 RX ORDER — MEPERIDINE HYDROCHLORIDE 25 MG/ML
12.5 INJECTION INTRAMUSCULAR; INTRAVENOUS; SUBCUTANEOUS EVERY 5 MIN PRN
Status: DISCONTINUED | OUTPATIENT
Start: 2022-03-31 | End: 2022-03-31 | Stop reason: HOSPADM

## 2022-03-31 RX ORDER — SODIUM CHLORIDE 0.9 % (FLUSH) 0.9 %
5-40 SYRINGE (ML) INJECTION EVERY 12 HOURS SCHEDULED
Status: DISCONTINUED | OUTPATIENT
Start: 2022-03-31 | End: 2022-04-01 | Stop reason: HOSPADM

## 2022-03-31 RX ORDER — ONDANSETRON 2 MG/ML
4 INJECTION INTRAMUSCULAR; INTRAVENOUS EVERY 6 HOURS PRN
Status: DISCONTINUED | OUTPATIENT
Start: 2022-03-31 | End: 2022-04-01 | Stop reason: HOSPADM

## 2022-03-31 RX ORDER — DIPHENHYDRAMINE HYDROCHLORIDE 50 MG/ML
12.5 INJECTION INTRAMUSCULAR; INTRAVENOUS
Status: DISCONTINUED | OUTPATIENT
Start: 2022-03-31 | End: 2022-03-31 | Stop reason: HOSPADM

## 2022-03-31 RX ORDER — SODIUM CHLORIDE 9 MG/ML
25 INJECTION, SOLUTION INTRAVENOUS PRN
Status: DISCONTINUED | OUTPATIENT
Start: 2022-03-31 | End: 2022-04-01 | Stop reason: HOSPADM

## 2022-03-31 RX ORDER — SODIUM CHLORIDE 0.9 % (FLUSH) 0.9 %
10 SYRINGE (ML) INJECTION PRN
Status: DISCONTINUED | OUTPATIENT
Start: 2022-03-31 | End: 2022-03-31 | Stop reason: HOSPADM

## 2022-03-31 RX ORDER — FENTANYL CITRATE 50 UG/ML
25 INJECTION, SOLUTION INTRAMUSCULAR; INTRAVENOUS EVERY 5 MIN PRN
Status: DISCONTINUED | OUTPATIENT
Start: 2022-03-31 | End: 2022-03-31 | Stop reason: HOSPADM

## 2022-03-31 RX ORDER — MORPHINE SULFATE 2 MG/ML
2 INJECTION, SOLUTION INTRAMUSCULAR; INTRAVENOUS
Status: DISCONTINUED | OUTPATIENT
Start: 2022-03-31 | End: 2022-04-01 | Stop reason: HOSPADM

## 2022-03-31 RX ORDER — SODIUM CHLORIDE 9 MG/ML
25 INJECTION, SOLUTION INTRAVENOUS PRN
Status: DISCONTINUED | OUTPATIENT
Start: 2022-03-31 | End: 2022-03-31 | Stop reason: HOSPADM

## 2022-03-31 RX ORDER — ACETAMINOPHEN 325 MG/1
650 TABLET ORAL EVERY 4 HOURS PRN
Status: DISCONTINUED | OUTPATIENT
Start: 2022-03-31 | End: 2022-04-01 | Stop reason: HOSPADM

## 2022-03-31 RX ORDER — FENTANYL CITRATE 50 UG/ML
INJECTION, SOLUTION INTRAMUSCULAR; INTRAVENOUS PRN
Status: DISCONTINUED | OUTPATIENT
Start: 2022-03-31 | End: 2022-03-31 | Stop reason: SDUPTHER

## 2022-03-31 RX ORDER — ROCURONIUM BROMIDE 10 MG/ML
INJECTION, SOLUTION INTRAVENOUS PRN
Status: DISCONTINUED | OUTPATIENT
Start: 2022-03-31 | End: 2022-03-31 | Stop reason: SDUPTHER

## 2022-03-31 RX ORDER — QUETIAPINE FUMARATE 50 MG/1
100 TABLET, EXTENDED RELEASE ORAL NIGHTLY
Status: DISCONTINUED | OUTPATIENT
Start: 2022-03-31 | End: 2022-04-01 | Stop reason: HOSPADM

## 2022-03-31 RX ORDER — GLYCOPYRROLATE 0.2 MG/ML
INJECTION INTRAMUSCULAR; INTRAVENOUS PRN
Status: DISCONTINUED | OUTPATIENT
Start: 2022-03-31 | End: 2022-03-31 | Stop reason: SDUPTHER

## 2022-03-31 RX ORDER — OXYCODONE HYDROCHLORIDE 5 MG/1
5 TABLET ORAL PRN
Status: DISCONTINUED | OUTPATIENT
Start: 2022-03-31 | End: 2022-03-31 | Stop reason: HOSPADM

## 2022-03-31 RX ORDER — BUPRENORPHINE 2 MG/1
16 TABLET SUBLINGUAL DAILY
Status: DISCONTINUED | OUTPATIENT
Start: 2022-04-01 | End: 2022-04-01 | Stop reason: HOSPADM

## 2022-03-31 RX ORDER — OXYCODONE HYDROCHLORIDE 10 MG/1
10 TABLET ORAL PRN
Status: DISCONTINUED | OUTPATIENT
Start: 2022-03-31 | End: 2022-03-31 | Stop reason: HOSPADM

## 2022-03-31 RX ORDER — ONDANSETRON 2 MG/ML
4 INJECTION INTRAMUSCULAR; INTRAVENOUS
Status: DISCONTINUED | OUTPATIENT
Start: 2022-03-31 | End: 2022-03-31 | Stop reason: HOSPADM

## 2022-03-31 RX ORDER — ALBUTEROL SULFATE 90 UG/1
2 AEROSOL, METERED RESPIRATORY (INHALATION) EVERY 4 HOURS PRN
Status: DISCONTINUED | OUTPATIENT
Start: 2022-03-31 | End: 2022-04-01 | Stop reason: HOSPADM

## 2022-03-31 RX ORDER — PROPOFOL 10 MG/ML
INJECTION, EMULSION INTRAVENOUS PRN
Status: DISCONTINUED | OUTPATIENT
Start: 2022-03-31 | End: 2022-03-31 | Stop reason: SDUPTHER

## 2022-03-31 RX ORDER — ONDANSETRON 2 MG/ML
INJECTION INTRAMUSCULAR; INTRAVENOUS PRN
Status: DISCONTINUED | OUTPATIENT
Start: 2022-03-31 | End: 2022-03-31 | Stop reason: SDUPTHER

## 2022-03-31 RX ORDER — MORPHINE SULFATE 4 MG/ML
4 INJECTION, SOLUTION INTRAMUSCULAR; INTRAVENOUS
Status: DISCONTINUED | OUTPATIENT
Start: 2022-03-31 | End: 2022-04-01 | Stop reason: HOSPADM

## 2022-03-31 RX ORDER — SODIUM CHLORIDE 0.9 % (FLUSH) 0.9 %
5-40 SYRINGE (ML) INJECTION EVERY 12 HOURS SCHEDULED
Status: DISCONTINUED | OUTPATIENT
Start: 2022-03-31 | End: 2022-03-31 | Stop reason: HOSPADM

## 2022-03-31 RX ORDER — MAGNESIUM HYDROXIDE 1200 MG/15ML
LIQUID ORAL
Status: COMPLETED | OUTPATIENT
Start: 2022-03-31 | End: 2022-03-31

## 2022-03-31 RX ORDER — ATROPA BELLADONNA AND OPIUM 16.2; 3 MG/1; MG/1
30 SUPPOSITORY RECTAL EVERY 8 HOURS PRN
Status: DISCONTINUED | OUTPATIENT
Start: 2022-03-31 | End: 2022-04-01 | Stop reason: HOSPADM

## 2022-03-31 RX ORDER — MIDAZOLAM HYDROCHLORIDE 1 MG/ML
INJECTION INTRAMUSCULAR; INTRAVENOUS PRN
Status: DISCONTINUED | OUTPATIENT
Start: 2022-03-31 | End: 2022-03-31 | Stop reason: SDUPTHER

## 2022-03-31 RX ORDER — SODIUM CHLORIDE 9 MG/ML
INJECTION, SOLUTION INTRAVENOUS PRN
Status: DISCONTINUED | OUTPATIENT
Start: 2022-03-31 | End: 2022-03-31 | Stop reason: HOSPADM

## 2022-03-31 RX ORDER — SODIUM CHLORIDE 9 MG/ML
INJECTION, SOLUTION INTRAVENOUS CONTINUOUS
Status: DISCONTINUED | OUTPATIENT
Start: 2022-03-31 | End: 2022-03-31

## 2022-03-31 RX ORDER — SODIUM CHLORIDE 0.9 % (FLUSH) 0.9 %
5-40 SYRINGE (ML) INJECTION PRN
Status: DISCONTINUED | OUTPATIENT
Start: 2022-03-31 | End: 2022-04-01 | Stop reason: HOSPADM

## 2022-03-31 RX ORDER — ONDANSETRON 4 MG/1
4 TABLET, ORALLY DISINTEGRATING ORAL EVERY 8 HOURS PRN
Status: DISCONTINUED | OUTPATIENT
Start: 2022-03-31 | End: 2022-04-01 | Stop reason: HOSPADM

## 2022-03-31 RX ORDER — MAGNESIUM HYDROXIDE 1200 MG/15ML
LIQUID ORAL CONTINUOUS PRN
Status: COMPLETED | OUTPATIENT
Start: 2022-03-31 | End: 2022-03-31

## 2022-03-31 RX ORDER — DEXMEDETOMIDINE HYDROCHLORIDE 100 UG/ML
INJECTION, SOLUTION INTRAVENOUS PRN
Status: DISCONTINUED | OUTPATIENT
Start: 2022-03-31 | End: 2022-03-31 | Stop reason: SDUPTHER

## 2022-03-31 RX ORDER — LIDOCAINE HYDROCHLORIDE ANHYDROUS AND DEXTROSE MONOHYDRATE .4; 5 G/100ML; G/100ML
INJECTION, SOLUTION INTRAVENOUS CONTINUOUS PRN
Status: DISCONTINUED | OUTPATIENT
Start: 2022-03-31 | End: 2022-03-31 | Stop reason: SDUPTHER

## 2022-03-31 RX ORDER — SODIUM CHLORIDE 0.9 % (FLUSH) 0.9 %
5-40 SYRINGE (ML) INJECTION PRN
Status: DISCONTINUED | OUTPATIENT
Start: 2022-03-31 | End: 2022-03-31 | Stop reason: HOSPADM

## 2022-03-31 RX ORDER — SODIUM CHLORIDE 9 MG/ML
INJECTION, SOLUTION INTRAVENOUS CONTINUOUS
Status: DISCONTINUED | OUTPATIENT
Start: 2022-03-31 | End: 2022-04-01 | Stop reason: HOSPADM

## 2022-03-31 RX ORDER — TROSPIUM CHLORIDE 20 MG/1
20 TABLET, FILM COATED ORAL
Status: DISCONTINUED | OUTPATIENT
Start: 2022-03-31 | End: 2022-04-01 | Stop reason: HOSPADM

## 2022-03-31 RX ORDER — DEXAMETHASONE SODIUM PHOSPHATE 4 MG/ML
INJECTION, SOLUTION INTRA-ARTICULAR; INTRALESIONAL; INTRAMUSCULAR; INTRAVENOUS; SOFT TISSUE PRN
Status: DISCONTINUED | OUTPATIENT
Start: 2022-03-31 | End: 2022-03-31 | Stop reason: SDUPTHER

## 2022-03-31 RX ORDER — VENLAFAXINE HYDROCHLORIDE 75 MG/1
150 CAPSULE, EXTENDED RELEASE ORAL DAILY
Status: DISCONTINUED | OUTPATIENT
Start: 2022-04-01 | End: 2022-04-01 | Stop reason: HOSPADM

## 2022-03-31 RX ADMIN — DEXMEDETOMIDINE 4 MCG: 100 INJECTION, SOLUTION, CONCENTRATE INTRAVENOUS at 15:03

## 2022-03-31 RX ADMIN — MORPHINE SULFATE 4 MG: 4 INJECTION, SOLUTION INTRAMUSCULAR; INTRAVENOUS at 17:29

## 2022-03-31 RX ADMIN — SODIUM CHLORIDE: 9 INJECTION, SOLUTION INTRAVENOUS at 13:13

## 2022-03-31 RX ADMIN — DEXMEDETOMIDINE 4 MCG: 100 INJECTION, SOLUTION, CONCENTRATE INTRAVENOUS at 14:59

## 2022-03-31 RX ADMIN — HYDROMORPHONE HYDROCHLORIDE 0.5 MG: 1 INJECTION, SOLUTION INTRAMUSCULAR; INTRAVENOUS; SUBCUTANEOUS at 15:00

## 2022-03-31 RX ADMIN — Medication 20 MG: at 12:43

## 2022-03-31 RX ADMIN — TROSPIUM CHLORIDE 20 MG: 20 TABLET, FILM COATED ORAL at 17:33

## 2022-03-31 RX ADMIN — ENOXAPARIN SODIUM 40 MG: 40 INJECTION SUBCUTANEOUS at 17:33

## 2022-03-31 RX ADMIN — FENTANYL CITRATE 50 MCG: 50 INJECTION INTRAMUSCULAR; INTRAVENOUS at 13:21

## 2022-03-31 RX ADMIN — DEXMEDETOMIDINE 2 MCG: 100 INJECTION, SOLUTION, CONCENTRATE INTRAVENOUS at 14:01

## 2022-03-31 RX ADMIN — MORPHINE SULFATE 4 MG: 4 INJECTION, SOLUTION INTRAMUSCULAR; INTRAVENOUS at 23:08

## 2022-03-31 RX ADMIN — LIDOCAINE HYDROCHLORIDE 100 MG: 20 INJECTION, SOLUTION EPIDURAL; INFILTRATION; INTRACAUDAL; PERINEURAL at 12:46

## 2022-03-31 RX ADMIN — Medication 10 MG: at 13:35

## 2022-03-31 RX ADMIN — QUETIAPINE FUMARATE 100 MG: 50 TABLET, EXTENDED RELEASE ORAL at 20:02

## 2022-03-31 RX ADMIN — DEXMEDETOMIDINE 4 MCG: 100 INJECTION, SOLUTION, CONCENTRATE INTRAVENOUS at 13:47

## 2022-03-31 RX ADMIN — SODIUM CHLORIDE: 9 INJECTION, SOLUTION INTRAVENOUS at 12:43

## 2022-03-31 RX ADMIN — ATROPA BELLADONNA AND OPIUM 30 MG: 16.2; 3 SUPPOSITORY RECTAL at 21:17

## 2022-03-31 RX ADMIN — ROCURONIUM BROMIDE 50 MG: 10 SOLUTION INTRAVENOUS at 12:46

## 2022-03-31 RX ADMIN — DEXAMETHASONE SODIUM PHOSPHATE 8 MG: 4 INJECTION, SOLUTION INTRAMUSCULAR; INTRAVENOUS at 12:56

## 2022-03-31 RX ADMIN — PROPOFOL 120 MG: 10 INJECTION, EMULSION INTRAVENOUS at 12:46

## 2022-03-31 RX ADMIN — SODIUM CHLORIDE: 9 INJECTION, SOLUTION INTRAVENOUS at 21:09

## 2022-03-31 RX ADMIN — ROCURONIUM BROMIDE 50 MG: 10 SOLUTION INTRAVENOUS at 14:02

## 2022-03-31 RX ADMIN — SODIUM CHLORIDE: 9 INJECTION, SOLUTION INTRAVENOUS at 11:17

## 2022-03-31 RX ADMIN — SUGAMMADEX 200 MG: 100 INJECTION, SOLUTION INTRAVENOUS at 14:53

## 2022-03-31 RX ADMIN — HYDROMORPHONE HYDROCHLORIDE 0.5 MG: 1 INJECTION, SOLUTION INTRAMUSCULAR; INTRAVENOUS; SUBCUTANEOUS at 15:35

## 2022-03-31 RX ADMIN — MIDAZOLAM 2 MG: 1 INJECTION INTRAMUSCULAR; INTRAVENOUS at 12:43

## 2022-03-31 RX ADMIN — ONDANSETRON 4 MG: 2 INJECTION INTRAMUSCULAR; INTRAVENOUS at 14:50

## 2022-03-31 RX ADMIN — CEFTRIAXONE 1000 MG: 1 INJECTION, POWDER, FOR SOLUTION INTRAMUSCULAR; INTRAVENOUS at 17:33

## 2022-03-31 RX ADMIN — CEFTRIAXONE 1000 MG: 1 INJECTION, POWDER, FOR SOLUTION INTRAMUSCULAR; INTRAVENOUS at 12:58

## 2022-03-31 RX ADMIN — MORPHINE SULFATE 4 MG: 4 INJECTION, SOLUTION INTRAMUSCULAR; INTRAVENOUS at 20:02

## 2022-03-31 RX ADMIN — DEXMEDETOMIDINE 2 MCG: 100 INJECTION, SOLUTION, CONCENTRATE INTRAVENOUS at 14:43

## 2022-03-31 RX ADMIN — GLYCOPYRROLATE 0.2 MG: 0.2 INJECTION, SOLUTION INTRAMUSCULAR; INTRAVENOUS at 12:43

## 2022-03-31 RX ADMIN — ONDANSETRON 4 MG: 2 INJECTION INTRAMUSCULAR; INTRAVENOUS at 20:01

## 2022-03-31 RX ADMIN — HYDROMORPHONE HYDROCHLORIDE 0.5 MG: 1 INJECTION, SOLUTION INTRAMUSCULAR; INTRAVENOUS; SUBCUTANEOUS at 15:47

## 2022-03-31 RX ADMIN — LIDOCAINE HYDROCHLORIDE 2 MG/MIN: 4 INJECTION, SOLUTION INTRAVENOUS at 12:51

## 2022-03-31 RX ADMIN — DEXMEDETOMIDINE 4 MCG: 100 INJECTION, SOLUTION, CONCENTRATE INTRAVENOUS at 14:53

## 2022-03-31 RX ADMIN — HYDROMORPHONE HYDROCHLORIDE 0.5 MG: 1 INJECTION, SOLUTION INTRAMUSCULAR; INTRAVENOUS; SUBCUTANEOUS at 15:29

## 2022-03-31 RX ADMIN — HYDROMORPHONE HYDROCHLORIDE 0.5 MG: 1 INJECTION, SOLUTION INTRAMUSCULAR; INTRAVENOUS; SUBCUTANEOUS at 16:13

## 2022-03-31 RX ADMIN — BISACODYL 5 MG: 5 TABLET, COATED ORAL at 17:33

## 2022-03-31 RX ADMIN — FENTANYL CITRATE 50 MCG: 50 INJECTION INTRAMUSCULAR; INTRAVENOUS at 12:46

## 2022-03-31 ASSESSMENT — PULMONARY FUNCTION TESTS
PIF_VALUE: 4
PIF_VALUE: 23
PIF_VALUE: 34
PIF_VALUE: 2
PIF_VALUE: 5
PIF_VALUE: 24
PIF_VALUE: 34
PIF_VALUE: 34
PIF_VALUE: 35
PIF_VALUE: 22
PIF_VALUE: 21
PIF_VALUE: 2
PIF_VALUE: 34
PIF_VALUE: 34
PIF_VALUE: 35
PIF_VALUE: 8
PIF_VALUE: 34
PIF_VALUE: 35
PIF_VALUE: 33
PIF_VALUE: 4
PIF_VALUE: 22
PIF_VALUE: 36
PIF_VALUE: 2
PIF_VALUE: 34
PIF_VALUE: 35
PIF_VALUE: 29
PIF_VALUE: 1
PIF_VALUE: 33
PIF_VALUE: 6
PIF_VALUE: 21
PIF_VALUE: 7
PIF_VALUE: 23
PIF_VALUE: 34
PIF_VALUE: 34
PIF_VALUE: 35
PIF_VALUE: 35
PIF_VALUE: 13
PIF_VALUE: 35
PIF_VALUE: 20
PIF_VALUE: 6
PIF_VALUE: 34
PIF_VALUE: 6
PIF_VALUE: 23
PIF_VALUE: 24
PIF_VALUE: 35
PIF_VALUE: 34
PIF_VALUE: 4
PIF_VALUE: 20
PIF_VALUE: 35
PIF_VALUE: 30
PIF_VALUE: 34
PIF_VALUE: 34
PIF_VALUE: 3
PIF_VALUE: 0
PIF_VALUE: 23
PIF_VALUE: 24
PIF_VALUE: 8
PIF_VALUE: 22
PIF_VALUE: 23
PIF_VALUE: 29
PIF_VALUE: 34
PIF_VALUE: 23
PIF_VALUE: 23
PIF_VALUE: 33
PIF_VALUE: 2
PIF_VALUE: 36
PIF_VALUE: 34
PIF_VALUE: 24
PIF_VALUE: 1
PIF_VALUE: 35
PIF_VALUE: 2
PIF_VALUE: 34
PIF_VALUE: 23
PIF_VALUE: 34
PIF_VALUE: 35
PIF_VALUE: 29
PIF_VALUE: 34
PIF_VALUE: 29
PIF_VALUE: 34
PIF_VALUE: 3
PIF_VALUE: 24
PIF_VALUE: 33
PIF_VALUE: 13
PIF_VALUE: 33
PIF_VALUE: 23
PIF_VALUE: 35
PIF_VALUE: 34
PIF_VALUE: 35
PIF_VALUE: 25
PIF_VALUE: 34
PIF_VALUE: 21
PIF_VALUE: 34
PIF_VALUE: 35
PIF_VALUE: 35
PIF_VALUE: 21
PIF_VALUE: 3
PIF_VALUE: 24
PIF_VALUE: 36
PIF_VALUE: 35
PIF_VALUE: 34
PIF_VALUE: 22
PIF_VALUE: 2
PIF_VALUE: 23
PIF_VALUE: 36
PIF_VALUE: 20
PIF_VALUE: 35
PIF_VALUE: 34
PIF_VALUE: 35
PIF_VALUE: 34
PIF_VALUE: 34
PIF_VALUE: 35
PIF_VALUE: 0
PIF_VALUE: 35
PIF_VALUE: 2
PIF_VALUE: 34
PIF_VALUE: 2
PIF_VALUE: 22
PIF_VALUE: 34
PIF_VALUE: 34
PIF_VALUE: 0
PIF_VALUE: 35
PIF_VALUE: 15
PIF_VALUE: 35
PIF_VALUE: 34
PIF_VALUE: 13
PIF_VALUE: 33
PIF_VALUE: 31
PIF_VALUE: 34
PIF_VALUE: 23
PIF_VALUE: 29
PIF_VALUE: 33
PIF_VALUE: 34
PIF_VALUE: 21
PIF_VALUE: 33
PIF_VALUE: 34
PIF_VALUE: 34
PIF_VALUE: 14

## 2022-03-31 ASSESSMENT — PAIN DESCRIPTION - ONSET
ONSET: ON-GOING

## 2022-03-31 ASSESSMENT — PAIN DESCRIPTION - PROGRESSION
CLINICAL_PROGRESSION: NOT CHANGED
CLINICAL_PROGRESSION: NOT CHANGED
CLINICAL_PROGRESSION: GRADUALLY WORSENING

## 2022-03-31 ASSESSMENT — PAIN SCALES - GENERAL
PAINLEVEL_OUTOF10: 7
PAINLEVEL_OUTOF10: 2
PAINLEVEL_OUTOF10: 7
PAINLEVEL_OUTOF10: 5
PAINLEVEL_OUTOF10: 8
PAINLEVEL_OUTOF10: 7
PAINLEVEL_OUTOF10: 7
PAINLEVEL_OUTOF10: 0
PAINLEVEL_OUTOF10: 7
PAINLEVEL_OUTOF10: 9
PAINLEVEL_OUTOF10: 7
PAINLEVEL_OUTOF10: 9

## 2022-03-31 ASSESSMENT — PAIN DESCRIPTION - FREQUENCY
FREQUENCY: CONTINUOUS

## 2022-03-31 ASSESSMENT — PAIN DESCRIPTION - PAIN TYPE
TYPE: SURGICAL PAIN

## 2022-03-31 ASSESSMENT — PAIN DESCRIPTION - LOCATION
LOCATION: ABDOMEN

## 2022-03-31 ASSESSMENT — PAIN DESCRIPTION - ORIENTATION: ORIENTATION: MID

## 2022-03-31 ASSESSMENT — LIFESTYLE VARIABLES: SMOKING_STATUS: 1

## 2022-03-31 ASSESSMENT — PAIN DESCRIPTION - DESCRIPTORS
DESCRIPTORS: ACHING
DESCRIPTORS: ACHING
DESCRIPTORS: STABBING

## 2022-03-31 ASSESSMENT — ENCOUNTER SYMPTOMS: SHORTNESS OF BREATH: 0

## 2022-03-31 NOTE — INTERVAL H&P NOTE
Update History & Physical    The patient's History and Physical was reviewed with the patient and I examined the patient. There was no change. The surgical site was confirmed by the patient and me. Plan: The risks, benefits, expected outcome, and alternative to the recommended procedure have been discussed with the patient. Patient understands and wants to proceed with the procedure.      Electronically signed by Falguni Og MD on 3/31/2022 at 12:28 PM

## 2022-03-31 NOTE — ANESTHESIA PRE PROCEDURE
Department of Anesthesiology  Preprocedure Note       Name:  Porfirio Mckeon   Age:  43 y.o.  :  1985                                          MRN:  [id number]         Date:  3/31/2022      Surgeon: Charlie Villalpando):  Polo Posada MD    Procedure: Procedure(s):  ROBOTIC REPAIR OF VESICOVAGINAL FISTULA  CYSTOSCOPY BILATERAL STENT PLACEMENT    Medications prior to admission:   Prior to Admission medications    Medication Sig Start Date End Date Taking?  Authorizing Provider   albuterol sulfate  (90 Base) MCG/ACT inhaler Inhale 2 puffs into the lungs every 4 hours as needed for Wheezing   Yes Historical Provider, MD   venlafaxine (EFFEXOR XR) 150 MG extended release capsule Take 150 mg by mouth daily   Yes Historical Provider, MD   paliperidone palmitate ER (INVEGA SUSTENNA) 117 MG/0.75ML FLORESITA IM injection Inject 117 mg into the muscle every 30 days   Yes Historical Provider, MD   Buprenorphine HCl (SUBUTEX SL) Place 16 mg under the tongue daily   Yes Historical Provider, MD   QUEtiapine (SEROQUEL XR) 50 MG extended release tablet Take 100 mg by mouth nightly    Yes Historical Provider, MD   dicyclomine (BENTYL) 10 MG capsule Take 2 capsules by mouth every 6 hours as needed (cramps) 20  SUZANNA Cortez - CNP   ketorolac (TORADOL) 10 MG tablet Take 1 tablet by mouth every 6 hours as needed for Pain 19  Ree Grimes MD   Sucralfate (CARAFATE PO) Take by mouth 4 times daily  22  Historical Provider, MD       Current medications:    Current Facility-Administered Medications   Medication Dose Route Frequency Provider Last Rate Last Admin    cefTRIAXone (ROCEPHIN) 1000 mg IVPB in 50 mL D5W minibag  1,000 mg IntraVENous Once Polo Posada MD        0.9 % sodium chloride infusion   IntraVENous Continuous Sharon Ojeda  mL/hr at 22 1117 New Bag at 22 1117    sodium chloride flush 0.9 % injection 10 mL  10 mL IntraVENous 2 times per day Benji Haney MD        sodium chloride flush 0.9 % injection 10 mL  10 mL IntraVENous PRN Benji Haney MD        0.9 % sodium chloride infusion  25 mL IntraVENous PRN Benji Haney MD           Allergies:  No Known Allergies    Problem List:    Patient Active Problem List   Diagnosis Code    Methamphetamine use disorder, moderate (HCC) F15.20    Mood disorder ([facility] F39    Pyelonephritis, acute N10    Chronic hepatitis C without hepatic coma ([facility] B18.2    Lesion of liver K76.9    Sepsis due to gram-negative UTI ([address] A41.50, N39.0       Past Medical History:        Diagnosis Date    Bipolar 1 disorder ([facility]     Hepatitis C     IN REMISSION PER PATIENT    Other disorders of kidney and ureter     PTSD (post-traumatic stress disorder)     SEVERE    Seasonal allergies        Past Surgical History:        Procedure Laterality Date    COLONOSCOPY      CYSTOSCOPY      stents    ENDOSCOPY, COLON, DIAGNOSTIC      HYSTERECTOMY N/A 9/23/2019    HYSTERECTOMY ABDOMINAL LAPAROSCOPIC ROBOTIC XI BILATERAL SALPINGECTOMY WITH CYSTOSCOPY performed by Meredith Moseley MD at [facility]      URETHRAL SURGERY N/A 2/2/2022    CYSTOSCOPY, CYSTOCELE REPAIR performed by Maria Dubose MD at [place] [address] History:    Social History     Tobacco Use    Smoking status: Current Some Day Smoker     Packs/day: 0.75     Types: Cigarettes    Smokeless tobacco: Never Used    Tobacco comment: 1-2 CIGARETTES/DAY   Substance Use Topics    Alcohol use:  No                                Ready to quit: Not Answered  Counseling given: Not Answered  Comment: 1-2 CIGARETTES/DAY      Vital Signs (Current):   Vitals:    03/16/22 1251 03/31/22 1057 03/31/22 1106   BP:   110/65   Pulse:   65   Resp:   16   Temp:   98.1 °F (36.7 °C)   TempSrc:   Temporal   SpO2:   97%   Weight: 135 lb (61.2 kg) 125 lb 1.8 oz (56.8 kg)    Height: 4' 11\" (1.499 m) 4' 11\" (1.499 m) BP Readings from Last 3 Encounters:   03/31/22 110/65   02/02/22 (!) 98/55   02/02/22 121/66       NPO Status: Time of last liquid consumption: 2100                        Time of last solid consumption: 2100                        Date of last liquid consumption: 03/30/22                        Date of last solid food consumption: 03/30/22    BMI:   Wt Readings from Last 3 Encounters:   03/31/22 125 lb 1.8 oz (56.8 kg)   02/02/22 140 lb (63.5 kg)   08/26/21 115 lb (52.2 kg)     Body mass index is 25.27 kg/m². CBC:   Lab Results   Component Value Date    WBC 8.1 03/26/2022    RBC 4.56 03/26/2022    HGB 13.0 03/26/2022    HCT 38.6 03/26/2022    MCV 84.7 03/26/2022    RDW 13.9 03/26/2022     03/26/2022       CMP:   Lab Results   Component Value Date     03/26/2022    K 4.4 03/26/2022    K 3.5 08/26/2021     03/26/2022    CO2 24 03/26/2022    BUN 7 03/26/2022    CREATININE 0.5 03/26/2022    GFRAA >60 03/26/2022    GFRAA >60 06/20/2011    AGRATIO 1.5 12/16/2020    LABGLOM >60 03/26/2022    GLUCOSE 85 03/26/2022    PROT 8.3 08/26/2021    PROT 6.9 06/20/2011    CALCIUM 9.2 03/26/2022    BILITOT 0.5 08/26/2021    ALKPHOS 86 08/26/2021    AST 22 08/26/2021    ALT 13 08/26/2021       POC Tests: No results for input(s): POCGLU, POCNA, POCK, POCCL, POCBUN, POCHEMO, POCHCT in the last 72 hours.     Coags:   Lab Results   Component Value Date    PROTIME 10.8 03/26/2022    INR 0.96 03/26/2022    APTT 49.2 03/26/2022       HCG (If Applicable):   Lab Results   Component Value Date    PREGTESTUR Negative 08/26/2021        ABGs: No results found for: PHART, PO2ART, SBT3SRV, ALB0LBV, BEART, Z6YSXPKK     Type & Screen (If Applicable):  No results found for: LABABO, LABRH    Drug/Infectious Status (If Applicable):  No results found for: HIV, HEPCAB    COVID-19 Screening (If Applicable):   Lab Results   Component Value Date    COVID19 Not Detected 03/26/2022    COVID19 Not Detected 01/31/2022           Anesthesia Evaluation  Patient summary reviewed no history of anesthetic complications:   Airway: Mallampati: II  TM distance: >3 FB   Neck ROM: full  Mouth opening: > = 3 FB Dental: normal exam   (+) upper dentures      Pulmonary:normal exam  breath sounds clear to auscultation  (+) current smoker    (-) shortness of breath          Patient smoked on day of surgery. Cardiovascular:Negative CV ROS  Exercise tolerance: good (>4 METS),           Rhythm: regular  Rate: normal                    Neuro/Psych:   (+) psychiatric history:depression/anxiety              ROS comment: Pt states marijuana and meth last used >2days ago GI/Hepatic/Renal: Neg GI/Hepatic/Renal ROS  (+) hepatitis: C,           Endo/Other: Negative Endo/Other ROS                    Abdominal:             Vascular: negative vascular ROS. Other Findings:             Anesthesia Plan      general     ASA 2       Induction: intravenous. MIPS: Postoperative opioids intended and Prophylactic antiemetics administered. Anesthetic plan and risks discussed with patient. Plan discussed with CRNA.     Attending anesthesiologist reviewed and agrees with Janet Malone MD   3/31/2022

## 2022-03-31 NOTE — PROGRESS NOTES
Vaginal Sweep Documentation     Vaginal prep sponge count performed by CARLOS ALBERTO Kaur and AARTI Blood. Count correct. Vaginal sweep performed by Dr. Darnell Rodriguez  at . No foreign objects or vaginal tears noted.

## 2022-03-31 NOTE — BRIEF OP NOTE
Brief Postoperative Note      Patient: Smitha Frost  YOB: 1985  MRN: [id number]    Date of Procedure: 3/31/2022    Pre-Op Diagnosis: VESICOVAGINAL FISTULA, PELVIC AND PERINEAL PAIN    Post-Op Diagnosis: Same       Procedure(s):  ROBOTIC REPAIR OF VESICOVAGINAL FISTULA  CYSTOSCOPY BILATERAL STENT PLACEMENT, bilateral stent removal    Surgeon(s):  Citlalli Marques MD    Assistant:  Surgical Assistant: Richard Dotson    Anesthesia: General    Estimated Blood Loss (mL): less than 50     Complications: None    Specimens:   * No specimens in log *    Implants:  Implant Name Type Inv.  Item Serial No.  Lot No. LRB No. Used Action   STENT URET 6FR L24CM PERCFLX HYDR+ SFT PGTL TAPR TIP W/O - [id number]  STENT URET 6FR L24CM PERCFLX HYDR+ SFT PGTL TAPR TIP W/O  [facility] [id number] Right 1 Implanted   STENT URET 6FR L24CM PERCFLX HYDR+ SFT PGTL TAPR TIP W/O - [id number]  STENT URET 6FR L24CM PERCFLX HYDR+ SFT PGTL TAPR TIP W/O  [facility] [id number] Left 1 Implanted   [id number] - [id number]   [id number]   Left 1 Implanted         Drains:   NG/OG/NJ/NE Tube Orogastric 18 fr Center mouth (Active)       Urethral Catheter (Active)       Findings: fistula at vaginal apex closed in 2 layers, clarix surgical matrix placed between bladder and vagina    Electronically signed by Citlalli Marques MD on 3/31/2022 at 2:59 PM

## 2022-03-31 NOTE — ANESTHESIA POSTPROCEDURE EVALUATION
Department of Anesthesiology  Postprocedure Note    Patient: Antwon Varela  MRN: [id number]  YOB: 1985  Date of evaluation: 3/31/2022  Time:  4:13 PM     Procedure Summary     Date: 03/31/22 Room / Location: [address]    Anesthesia Start: 1243 Anesthesia Stop: 6676    Procedures:       ROBOTIC REPAIR OF VESICOVAGINAL FISTULA (N/A )      CYSTOSCOPY BILATERAL STENT PLACEMENT (Bilateral ) Diagnosis:       Vesicovaginal fistula      Pelvic and perineal pain      (VESICOVAGINAL FISTULA, PELVIC AND PERINEAL PAIN)    Surgeons: Pablo Schultz MD Responsible Provider: Prabhjot Gibbs MD    Anesthesia Type: general ASA Status: 2          Anesthesia Type: general    Haley Phase I: Haley Score: 8    Haley Phase II:      Last vitals: Reviewed and per EMR flowsheets.        Anesthesia Post Evaluation    Patient location during evaluation: PACU  Patient participation: complete - patient participated  Level of consciousness: awake and alert  Pain score: 5  Nausea & Vomiting: no nausea  Complications: no  Cardiovascular status: hemodynamically stable  Respiratory status: acceptable  Hydration status: stable

## 2022-03-31 NOTE — PROGRESS NOTES
To pacu from OR. Pt awake, states pain 5/10.  5 small incisions to abd with skin glue - all dry and intact. Abd soft. IV infusing. Monitor in sinus rhythm. Quiroz draining kyle urine.

## 2022-04-01 VITALS
SYSTOLIC BLOOD PRESSURE: 103 MMHG | HEIGHT: 59 IN | OXYGEN SATURATION: 95 % | BODY MASS INDEX: 26.04 KG/M2 | TEMPERATURE: 97.9 F | WEIGHT: 129.19 LBS | HEART RATE: 84 BPM | DIASTOLIC BLOOD PRESSURE: 67 MMHG | RESPIRATION RATE: 17 BRPM

## 2022-04-01 PROCEDURE — 6370000000 HC RX 637 (ALT 250 FOR IP): Performed by: UROLOGY

## 2022-04-01 PROCEDURE — 6360000002 HC RX W HCPCS: Performed by: UROLOGY

## 2022-04-01 PROCEDURE — 94761 N-INVAS EAR/PLS OXIMETRY MLT: CPT

## 2022-04-01 PROCEDURE — 2700000000 HC OXYGEN THERAPY PER DAY

## 2022-04-01 PROCEDURE — 2580000003 HC RX 258: Performed by: UROLOGY

## 2022-04-01 RX ORDER — HYDROCODONE BITARTRATE AND ACETAMINOPHEN 5; 325 MG/1; MG/1
1 TABLET ORAL EVERY 6 HOURS PRN
Qty: 10 TABLET | Refills: 0 | Status: SHIPPED | OUTPATIENT
Start: 2022-04-01 | End: 2022-04-04

## 2022-04-01 RX ADMIN — MORPHINE SULFATE 4 MG: 4 INJECTION, SOLUTION INTRAMUSCULAR; INTRAVENOUS at 04:18

## 2022-04-01 RX ADMIN — BISACODYL 5 MG: 5 TABLET, COATED ORAL at 08:55

## 2022-04-01 RX ADMIN — TROSPIUM CHLORIDE 20 MG: 20 TABLET, FILM COATED ORAL at 06:41

## 2022-04-01 RX ADMIN — MORPHINE SULFATE 4 MG: 4 INJECTION, SOLUTION INTRAMUSCULAR; INTRAVENOUS at 10:54

## 2022-04-01 RX ADMIN — VENLAFAXINE HYDROCHLORIDE 150 MG: 75 CAPSULE, EXTENDED RELEASE ORAL at 08:55

## 2022-04-01 RX ADMIN — BUPRENORPHINE HCL 16 MG: 2 TABLET SUBLINGUAL at 08:54

## 2022-04-01 RX ADMIN — SODIUM CHLORIDE: 9 INJECTION, SOLUTION INTRAVENOUS at 04:20

## 2022-04-01 RX ADMIN — MORPHINE SULFATE 2 MG: 2 INJECTION, SOLUTION INTRAMUSCULAR; INTRAVENOUS at 06:41

## 2022-04-01 ASSESSMENT — PAIN DESCRIPTION - LOCATION
LOCATION: ABDOMEN
LOCATION: ABDOMEN

## 2022-04-01 ASSESSMENT — PAIN DESCRIPTION - FREQUENCY
FREQUENCY: CONTINUOUS
FREQUENCY: CONTINUOUS

## 2022-04-01 ASSESSMENT — PAIN DESCRIPTION - ORIENTATION
ORIENTATION: RIGHT
ORIENTATION: RIGHT;LOWER

## 2022-04-01 ASSESSMENT — PAIN SCALES - GENERAL
PAINLEVEL_OUTOF10: 7
PAINLEVEL_OUTOF10: 4

## 2022-04-01 ASSESSMENT — PAIN DESCRIPTION - ONSET
ONSET: ON-GOING
ONSET: ON-GOING

## 2022-04-01 ASSESSMENT — PAIN - FUNCTIONAL ASSESSMENT
PAIN_FUNCTIONAL_ASSESSMENT: PREVENTS OR INTERFERES SOME ACTIVE ACTIVITIES AND ADLS
PAIN_FUNCTIONAL_ASSESSMENT: PREVENTS OR INTERFERES SOME ACTIVE ACTIVITIES AND ADLS

## 2022-04-01 ASSESSMENT — PAIN DESCRIPTION - PROGRESSION
CLINICAL_PROGRESSION: NOT CHANGED
CLINICAL_PROGRESSION: NOT CHANGED

## 2022-04-01 ASSESSMENT — PAIN DESCRIPTION - PAIN TYPE
TYPE: SURGICAL PAIN
TYPE: SURGICAL PAIN

## 2022-04-01 ASSESSMENT — PAIN DESCRIPTION - DESCRIPTORS
DESCRIPTORS: THROBBING
DESCRIPTORS: SHARP

## 2022-04-01 NOTE — CARE COORDINATION
CASE MANAGEMENT DISCHARGE SUMMARY:    DISCHARGE DATE: 04/01/22    DISCHARGED TO HOME     TRANSPORTATION: [facility]             TIME: at bedside     Electronically signed by Reji Foley RN on 4/1/2022 at 9:50 AM

## 2022-04-01 NOTE — PROGRESS NOTES
Reviewed discharge and follow up information with patient, answered all questions. Patient alert, without s/s of distress at discharge. Paper prescription provided to patient from Dr. Michael Chapman for pain medications. Transport called for wheelchair to lobby for discharge.

## 2022-04-01 NOTE — PROGRESS NOTES
Pt Name: Lex Nguyen [place] Record Number: [id number]  Date of Birth 1985   Today's Date: 4/1/2022      Subjective:  Pain controlled. Denies nausea. No fever    ROS: Constitutional: No fever    Vitals:  Vitals:    03/31/22 2010 04/01/22 0026 04/01/22 0445 04/01/22 0652   BP: 133/83 126/82 114/68    Pulse: 69 68 68    Resp: 16 16 16    Temp: 98.1 °F (36.7 °C) 98.5 °F (36.9 °C) 98 °F (36.7 °C)    TempSrc: Oral Oral Oral    SpO2: 99% 100% 98%    Weight:    129 lb 3 oz (58.6 kg)   Height:         I/O last 3 completed shifts: In: 1200 [I.V.:1150; IV Piggyback:50]  Out: 75 [Urine:50; Blood:25]    Exam:  General: Awake, oriented, no acute distress  Respiratory: Nonlabored breathing  Abdomen: Soft,  Appropriately tender, non-distended, no masses  : urine clear  Skin: Skin color, texture, turgor normal, no rashes or lesions  Neurologic: no gross deficits    CURRENT MEDICATIONS   Scheduled Meds:   QUEtiapine  100 mg Oral Nightly    venlafaxine  150 mg Oral Daily    buprenorphine  16 mg SubLINGual Daily    sodium chloride flush  5-40 mL IntraVENous 2 times per day    bisacodyl  5 mg Oral Daily    enoxaparin  40 mg SubCUTAneous Q24H    trospium  20 mg Oral BID AC    cefTRIAXone (ROCEPHIN) IV  1,000 mg IntraVENous Q24H     Continuous Infusions:   sodium chloride      sodium chloride 125 mL/hr at 04/01/22 0420     PRN Meds:.albuterol sulfate HFA, sodium chloride flush, sodium chloride, oxyCODONE, acetaminophen, morphine **OR** morphine, ondansetron **OR** ondansetron, opium-belladonna    LABS         ASSESSMENT   1. Hospital day # 1  2. POD#1 vesicovaginal fistula repair  PLAN   1. Home today with indwelling jules X 2 weeks  2.  Our office will contact her to arrange follow up appointment    Ila Hernandez MD 4/1/2022 7:00 AM

## 2022-04-01 NOTE — PROGRESS NOTES
Educated patient on catheter care, leg and overnight bags, as well as how to change the bag and measure urine output. Patient and family member verbalized understanding. Extra supplies provided to patient.

## 2022-04-01 NOTE — DISCHARGE INSTR - DIET
Good nutrition is important when healing from an illness, injury, or surgery. Follow any nutrition recommendations given to you during your hospital stay. If you were given an oral nutrition supplement while in the hospital, continue to take this supplement at home. You can take it with meals, in-between meals, and/or before bedtime. These supplements can be purchased at most local grocery stores, pharmacies, and chain [employer]stores. If you have any questions about your diet or nutrition, call the hospital and ask for the dietitian.     Regular diet as tolerated

## 2022-04-01 NOTE — PLAN OF CARE
Problem: Pain:  Description: Pain management should include both nonpharmacologic and pharmacologic interventions.   Goal: Pain level will decrease  Description: Pain level will decrease  4/1/2022 1018 by Jillian Mckinley RN  Outcome: Completed  4/1/2022 0342 by Crys Tovar RN  Outcome: Ongoing  4/1/2022 0341 by Crys Tovar RN  Outcome: Ongoing  Goal: Control of acute pain  Description: Control of acute pain  4/1/2022 1018 by Jillian Mckinley RN  Outcome: Completed  4/1/2022 0342 by Crys Tovar RN  Outcome: Ongoing  4/1/2022 0341 by Crys Tovar RN  Outcome: Ongoing  Goal: Control of chronic pain  Description: Control of chronic pain  4/1/2022 1018 by Jillian Mckinley RN  Outcome: Completed  4/1/2022 0342 by Crys Tovar RN  Outcome: Ongoing  4/1/2022 0341 by Crys Tovar RN  Outcome: Ongoing  Goal: Patient's pain/discomfort is manageable  Description: Patient's pain/discomfort is manageable  4/1/2022 1018 by Jillian Mckinley RN  Outcome: Completed  4/1/2022 0342 by Crys Tovar RN  Outcome: Ongoing  4/1/2022 0341 by Crys Tovar RN  Outcome: Ongoing     Problem: Infection:  Goal: Will remain free from infection  Description: Will remain free from infection  4/1/2022 1018 by Jillian Mckinley RN  Outcome: Completed  4/1/2022 0342 by Crys Tovar RN  Outcome: Ongoing  4/1/2022 0341 by Crys Tovar RN  Outcome: Ongoing     Problem: Safety:  Goal: Free from accidental physical injury  Description: Free from accidental physical injury  4/1/2022 1018 by Jillian Mckinley RN  Outcome: Completed  4/1/2022 0342 by Crys Tovar RN  Outcome: Ongoing  4/1/2022 0341 by Crys Tovar RN  Outcome: Ongoing  Goal: Free from intentional harm  Description: Free from intentional harm  4/1/2022 1018 by Jillian Mckinley RN  Outcome: Completed  4/1/2022 0342 by Crys Halter, RN  Outcome: Ongoing  4/1/2022 0341 by Crys Tovar RN  Outcome: Ongoing     Problem: Daily Care:  Goal: Daily care needs are met  Description: Daily care needs are met  4/1/2022 1018 by Luis Colorado RN  Outcome: Completed  4/1/2022 0342 by Romero Merida RN  Outcome: Ongoing  4/1/2022 0341 by Romero Merida RN  Outcome: Ongoing     Problem: Skin Integrity:  Goal: Skin integrity will stabilize  Description: Skin integrity will stabilize  4/1/2022 1018 by Luis Colorado RN  Outcome: Completed  4/1/2022 0342 by Romero Merida RN  Outcome: Ongoing  4/1/2022 0341 by Romero Merida RN  Outcome: Ongoing     Problem: Discharge Planning:  Goal: Patients continuum of care needs are met  Description: Patients continuum of care needs are met  4/1/2022 1018 by Luis Colorado RN  Outcome: Completed  4/1/2022 0342 by Romero Merida RN  Outcome: Ongoing  4/1/2022 0341 by Romero Merida RN  Outcome: Ongoing     Problem: Falls - Risk of:  Goal: Will remain free from falls  Description: Will remain free from falls  4/1/2022 1018 by Luis Colorado RN  Outcome: Completed  4/1/2022 0342 by Romero Merida RN  Outcome: Ongoing  4/1/2022 0341 by Romero Merida RN  Outcome: Ongoing  Goal: Absence of physical injury  Description: Absence of physical injury  4/1/2022 1018 by Luis Colorado RN  Outcome: Completed  4/1/2022 0342 by Romero Merida RN  Outcome: Ongoing  4/1/2022 0341 by Romero Merida RN  Outcome: Ongoing

## 2022-04-01 NOTE — PROGRESS NOTES
Pt awake with complaint of abdominal pressure. Pt states she was able to have BM earlier. Pt states pain-spasms and pressure are constant. No drainage noted at 5 stab sites. Pt offered and is acceptable to B& O suppository at this time.  Med administered per Mar.

## 2022-04-01 NOTE — DISCHARGE SUMMARY
Diagnosis:  Vesicovaginal fistula    Procedure:  Robotic repair of vesicovaginal fistula 3/31/22    Hospital course:  She had an uneventful post op course. Urine was clear and pain was controlled on post op day #1. She was discharged in stable condition with jules to drainage.     Condition on discharge:  stable

## 2022-04-01 NOTE — CARE COORDINATION
INITIAL CASE MANAGEMENT ASSESSMENT    Reviewed chart, met with patient to assess possible discharge needs. Explained Case Management role/services. Living Situation: verified address, lives in a 1 story house with 1 [address]    ADLs: independent     Transportation: active , will have a ride home at dc     Medications: no barriers, uses [employer] in [place]    PCP: *EVELYN Burns    PLAN/COMMENTS: denies any needs, plans to return home at dc, rickie will take her home    CM provided contact information for patient or family to call with any questions. CM will follow and assist as needed.     Delmi Spencer RN, BSN,   [phone]    Electronically signed by Delmi Spencer RN on 4/1/2022 at 9:48 AM

## 2022-04-01 NOTE — PLAN OF CARE
Problem: Pain:  Goal: Pain level will decrease  Description: Pain level will decrease  4/1/2022 0342 by Orville Crespo RN  Outcome: Ongoing  4/1/2022 0341 by Orville Crespo RN  Outcome: Ongoing  Goal: Control of acute pain  Description: Control of acute pain  4/1/2022 0342 by Orville Crespo RN  Outcome: Ongoing  4/1/2022 0341 by Orville Crespo RN  Outcome: Ongoing  Goal: Control of chronic pain  Description: Control of chronic pain  4/1/2022 0342 by Orville Crespo RN  Outcome: Ongoing  4/1/2022 0341 by Orville Crespo RN  Outcome: Ongoing  Goal: Patient's pain/discomfort is manageable  Description: Patient's pain/discomfort is manageable  4/1/2022 0342 by Orville Crespo RN  Outcome: Ongoing  4/1/2022 0341 by Orville Crespo RN  Outcome: Ongoing     Problem: Pain: medication administered per Mar  Goal: Control of acute pain  Description: Control of acute pain  4/1/2022 0342 by Orville Crespo RN  Outcome: Ongoing  4/1/2022 0341 by Orville Crespo RN  Outcome: Ongoing     Problem: Pain:  Goal: Control of chronic pain  Description: Control of chronic pain  4/1/2022 0342 by Orville Crespo RN  Outcome: Ongoing  4/1/2022 0341 by Orville Crespo RN  Outcome: Ongoing     Problem: Pain:  Goal: Patient's pain/discomfort is manageable  Description: Patient's pain/discomfort is manageable  4/1/2022 0342 by Orville Crespo RN  Outcome: Ongoing  4/1/2022 0341 by Orville Crespo RN  Outcome: Ongoing     Problem: Infection:  Goal: Will remain free from infection  Description: Will remain free from infection  4/1/2022 0342 by Orville Crespo RN  Outcome: Ongoing  4/1/2022 0341 by Orville Crespo RN  Outcome: Ongoing     Problem: Safety:  Goal: Free from accidental physical injury  Description: Free from accidental physical injury  4/1/2022 0342 by Orville Crespo RN  Outcome: Ongoing  4/1/2022 0341 by Orville Crespo RN  Outcome: Ongoing  Goal: Free from intentional harm  Description: Free from intentional harm  4/1/2022 7351 by Antolin Frankel RN  Outcome: Ongoing  4/1/2022 0341 by Antolin Frankel RN  Outcome: Ongoing

## 2022-04-01 NOTE — PROGRESS NOTES
Patient alert and oriented x4, VSS, laying in bed. Patient c/o right side ABD pain, denies n/v, diarrhea, SOB. Surgical incisions CDI with surgical glue. Quiroz catheter in place draining kyle urine to gravity. Patient denies further needs at this time. Bed in lowest and locked position with bed alarm in place. Non-slip socks on, call light within reach. Will continue to monitor pt needs.

## 2022-04-01 NOTE — PLAN OF CARE
Problem: Pain: Medication reviewed with patient at bedside   Goal: Pain level will decrease  Description: Pain level will decrease  Outcome: Ongoing  Goal: Control of acute pain  Description: Control of acute pain  Outcome: Ongoing  Goal: Control of chronic pain  Description: Control of chronic pain  Outcome: Ongoing  Goal: Patient's pain/discomfort is manageable  Description: Patient's pain/discomfort is manageable  Outcome: Ongoing     Problem: Infection:  Goal: Will remain free from infection  Description: Will remain free from infection  Outcome: Ongoing     Problem: Safety:  Goal: Free from accidental physical injury  Description: Free from accidental physical injury  Outcome: Ongoing  Goal: Free from intentional harm  Description: Free from intentional harm  Outcome: Ongoing

## 2022-04-01 NOTE — OP NOTE
[facility]           [address]                                OPERATIVE REPORT    PATIENT NAME: Little Barrera                     :        1985  MED REC NO:   [id number]                          ROOM:       3410  ACCOUNT NO:   [de-identified]                           ADMIT DATE: 2022  PROVIDER:     Marcos Shine MD    DATE OF PROCEDURE:  2022    PREOPERATIVE DIAGNOSIS:  Vesicovaginal fistula. POSTOPERATIVE DIAGNOSIS:  Vesicovaginal fistula. PROCEDURE PERFORMED:  Robotic-assisted laparoscopic repair of  vesicovaginal fistula, cystoscopy, bilateral ureteral stent insertion  and bilateral ureteral stent removal.    SURGEON:  Marcos Shine MD    ANESTHESIA:  General.    COMPLICATIONS:  None. BLOOD LOSS:  Less than 50 mL. INDICATIONS:  A 67-year-old female status post robotic hysterectomy in  10/2019. Since that time, she has had persistent leakage of urine per  vagina. She was recently diagnosed with a vesicovaginal fistula. She  is here for repair of the fistula. PROCEDURE:  She was given Rocephin. She was taken to the operative  suite. She moved onto the table. Anesthesia was induced. Her position  was changed to the lithotomy position. Her genitalia and lower abdomen  were prepped and draped. I started with cystoscopy. The 21-Chilean  cystoscope advanced easily through the urethra into the bladder. The  bladder was evaluated. Both ureteral orifices were normal.  The fistula  was noted in the midline just proximal to the trigone. A 0.035 ZIPwire  was advanced into the right ureteral orifice, then a 6-Chilean x 24 cm  double-J stent was advanced over the wire in the typical fashion. The  wire was withdrawn making sure the stent was in the appropriate  position. This was then repeated on the patient's left side.   Once the  stents were in the appropriate position, the ZIPwire this  point, an EEA sizer was placed in the vagina to help identify the  vagina. I then started dissecting the plane between the vagina and the  bladder. Almost immediately, the fistula was encountered. The Reading  catheter was identified. I continued to carefully separate the vagina  from the bladder. The edges of the fistula on the vaginal side were  then cleaned up by trimming the edges and then I closed the vagina  transversely with a running 2-0 Vicryl stitch. Now attention was turned to the bladder. Again, the edges of the  fistula were trimmed. I continued the dissection between the bladder  and the vagina to ensure that there were clear edges of the bladder to  repair. Once the opening was clearly identified using the [facility]  catheter, the bladder was then closed in two layers with 3-0 Vicryl  stitch. I then tested the repair by filling the bladder with 200 mL of  methylene blue dyed saline. There was no leak identified. Since there  was no omentum to place between the vagina and the bladder, I elected to  use a piece of Clarix surgical matrix. This was passed into the field  and then secured to the vagina with three separate Vicryl sutures in the  plane between the vagina and the bladder. I again filled the bladder  once more to ensure there was no leak identified. This showed no  extravasation from the bladder. The catheter was then returned to  gravity drainage. The robotic ports were then removed the abdominal  incisions were closed in the subcuticular layer with Vicryl stitch  followed by Dermabond. The cystoscope was then reinserted into the  bladder after removing the Quiroz catheter. The right stent was removed  with a grasper and then the left stent was removed. The cystoscope was  withdrawn. The Quiroz catheter was reinserted. The balloon was filled  with 10 mL of saline. The patient was then awakened and transferred to  recovery having tolerated the procedure well.   She will be monitored  overnight tonight. We will plan to leave the catheter for 2 weeks. She  will have cystoscopy with pelvic exam under anesthesia in 2 weeks to  ensure healing of the fistula.         Quin Johnson MD    D: 03/31/2022 16:00:02       T: 03/31/2022 16:04:43     [email or web address]  Job#: [id number]     Doc#: [id number]    CC:

## 2023-06-05 ENCOUNTER — HOSPITAL ENCOUNTER (OUTPATIENT)
Age: 38
Discharge: HOME | End: 2023-06-05
Payer: MEDICAID

## 2023-06-05 LAB
ALBUMIN SERPL-MCNC: 4.1 G/DL (ref 3.4–5)
ALBUMIN/GLOB SERPL: 1.3 {RATIO} (ref 1.1–2.2)
ALP SERPL-CCNC: 113 U/L (ref 40–129)
ALT SERPL-CCNC: 13 U/L (ref 10–40)
ANION GAP SERPL CALCULATED.3IONS-SCNC: 14 MMOL/L (ref 3–16)
AST SERPL-CCNC: 14 U/L (ref 15–37)
BILIRUB SERPL-MCNC: <0.2 MG/DL (ref 0–1)
BUN SERPL-MCNC: 7 MG/DL (ref 7–20)
CALCIUM SERPL-MCNC: 9.5 MG/DL (ref 8.3–10.6)
CHLORIDE SERPL-SCNC: 102 MMOL/L (ref 99–110)
CO2 SERPL-SCNC: 21 MMOL/L (ref 21–32)
CREAT SERPL-MCNC: 0.6 MG/DL (ref 0.6–1.1)
GFR SERPLBLD CREATININE-BSD FMLA CKD-EPI: >60 ML/MIN/{1.73_M2}
GLUCOSE SERPL-MCNC: 121 MG/DL (ref 70–99)
LITHIUM DOSE: ABNORMAL MG
LITHIUM SERPL-MCNC: 0.2 MMOL/L (ref 0.6–1.2)
POTASSIUM SERPL-SCNC: 3.7 MMOL/L (ref 3.5–5.1)
PROT SERPL-MCNC: 7.3 G/DL (ref 6.4–8.2)
SODIUM SERPL-SCNC: 137 MMOL/L (ref 136–145)

## 2023-06-05 PROCEDURE — 80178 ASSAY OF LITHIUM: CPT

## 2023-06-05 PROCEDURE — 80053 COMPREHEN METABOLIC PANEL: CPT

## 2023-12-14 ENCOUNTER — APPOINTMENT (OUTPATIENT)
Dept: CT IMAGING | Age: 38
End: 2023-12-14
Attending: STUDENT IN AN ORGANIZED HEALTH CARE EDUCATION/TRAINING PROGRAM
Payer: MEDICAID

## 2023-12-14 ENCOUNTER — HOSPITAL ENCOUNTER (EMERGENCY)
Age: 38
Discharge: ANOTHER HOSPITAL | End: 2023-12-15
Attending: STUDENT IN AN ORGANIZED HEALTH CARE EDUCATION/TRAINING PROGRAM
Payer: MEDICAID

## 2023-12-14 DIAGNOSIS — N20.0 KIDNEY STONE: Primary | ICD-10-CM

## 2023-12-14 DIAGNOSIS — N10 ACUTE PYELONEPHRITIS: ICD-10-CM

## 2023-12-14 LAB
ALBUMIN SERPL-MCNC: 5 G/DL (ref 3.4–5)
ALBUMIN/GLOB SERPL: 1.6 {RATIO} (ref 1.1–2.2)
ALP SERPL-CCNC: 91 U/L (ref 40–129)
ALT SERPL-CCNC: 6 U/L (ref 10–40)
AMORPH SED URNS QL MICRO: ABNORMAL /HPF
ANION GAP SERPL CALCULATED.3IONS-SCNC: 14 MMOL/L (ref 3–16)
AST SERPL-CCNC: 13 U/L (ref 15–37)
BACTERIA URNS QL MICRO: ABNORMAL /HPF
BASOPHILS # BLD: 0 K/UL (ref 0–0.2)
BASOPHILS NFR BLD: 0.2 %
BILIRUB SERPL-MCNC: 0.3 MG/DL (ref 0–1)
BILIRUB UR QL STRIP.AUTO: NEGATIVE
BUN SERPL-MCNC: 14 MG/DL (ref 7–20)
CALCIUM SERPL-MCNC: 10.1 MG/DL (ref 8.3–10.6)
CHLORIDE SERPL-SCNC: 101 MMOL/L (ref 99–110)
CLARITY UR: CLEAR
CO2 SERPL-SCNC: 24 MMOL/L (ref 21–32)
COLOR UR: YELLOW
CREAT SERPL-MCNC: 0.7 MG/DL (ref 0.6–1.1)
DEPRECATED RDW RBC AUTO: 14.1 % (ref 12.4–15.4)
EOSINOPHIL # BLD: 0 K/UL (ref 0–0.6)
EOSINOPHIL NFR BLD: 0.1 %
EPI CELLS #/AREA URNS HPF: ABNORMAL /HPF (ref 0–5)
GFR SERPLBLD CREATININE-BSD FMLA CKD-EPI: >60 ML/MIN/{1.73_M2}
GLUCOSE SERPL-MCNC: 129 MG/DL (ref 70–99)
GLUCOSE UR STRIP.AUTO-MCNC: NEGATIVE MG/DL
HCG SERPL QL: NEGATIVE
HCT VFR BLD AUTO: 38.4 % (ref 36–48)
HGB BLD-MCNC: 12.8 G/DL (ref 12–16)
HGB UR QL STRIP.AUTO: ABNORMAL
KETONES UR STRIP.AUTO-MCNC: 15 MG/DL
LEUKOCYTE ESTERASE UR QL STRIP.AUTO: ABNORMAL
LIPASE SERPL-CCNC: 12 U/L (ref 13–60)
LYMPHOCYTES # BLD: 0.5 K/UL (ref 1–5.1)
LYMPHOCYTES NFR BLD: 3.5 %
MAGNESIUM SERPL-MCNC: 1.8 MG/DL (ref 1.8–2.4)
MCH RBC QN AUTO: 26.8 PG (ref 26–34)
MCHC RBC AUTO-ENTMCNC: 33.3 G/DL (ref 31–36)
MCV RBC AUTO: 80.5 FL (ref 80–100)
MONOCYTES # BLD: 0.3 K/UL (ref 0–1.3)
MONOCYTES NFR BLD: 2.1 %
NEUTROPHILS # BLD: 13.4 K/UL (ref 1.7–7.7)
NEUTROPHILS NFR BLD: 94.1 %
NITRITE UR QL STRIP.AUTO: POSITIVE
PH UR STRIP.AUTO: 5 [PH] (ref 5–8)
PLATELET # BLD AUTO: 212 K/UL (ref 135–450)
PMV BLD AUTO: 9.1 FL (ref 5–10.5)
POTASSIUM SERPL-SCNC: 3.4 MMOL/L (ref 3.5–5.1)
PROT SERPL-MCNC: 8.1 G/DL (ref 6.4–8.2)
PROT UR STRIP.AUTO-MCNC: ABNORMAL MG/DL
RBC # BLD AUTO: 4.77 M/UL (ref 4–5.2)
RBC #/AREA URNS HPF: ABNORMAL /HPF (ref 0–4)
SODIUM SERPL-SCNC: 139 MMOL/L (ref 136–145)
SP GR UR STRIP.AUTO: >=1.03 (ref 1–1.03)
UA COMPLETE W REFLEX CULTURE PNL UR: ABNORMAL
UA DIPSTICK W REFLEX MICRO PNL UR: YES
URN SPEC COLLECT METH UR: ABNORMAL
UROBILINOGEN UR STRIP-ACNC: 0.2 E.U./DL
WBC # BLD AUTO: 14.2 K/UL (ref 4–11)
WBC #/AREA URNS HPF: ABNORMAL /HPF (ref 0–5)

## 2023-12-14 PROCEDURE — 85025 COMPLETE CBC W/AUTO DIFF WBC: CPT

## 2023-12-14 PROCEDURE — 36415 COLL VENOUS BLD VENIPUNCTURE: CPT

## 2023-12-14 PROCEDURE — 2580000003 HC RX 258: Performed by: STUDENT IN AN ORGANIZED HEALTH CARE EDUCATION/TRAINING PROGRAM

## 2023-12-14 PROCEDURE — 6360000004 HC RX CONTRAST MEDICATION: Performed by: STUDENT IN AN ORGANIZED HEALTH CARE EDUCATION/TRAINING PROGRAM

## 2023-12-14 PROCEDURE — 83690 ASSAY OF LIPASE: CPT

## 2023-12-14 PROCEDURE — 80053 COMPREHEN METABOLIC PANEL: CPT

## 2023-12-14 PROCEDURE — 87186 SC STD MICRODIL/AGAR DIL: CPT

## 2023-12-14 PROCEDURE — 6360000002 HC RX W HCPCS: Performed by: STUDENT IN AN ORGANIZED HEALTH CARE EDUCATION/TRAINING PROGRAM

## 2023-12-14 PROCEDURE — 81001 URINALYSIS AUTO W/SCOPE: CPT

## 2023-12-14 PROCEDURE — 87086 URINE CULTURE/COLONY COUNT: CPT

## 2023-12-14 PROCEDURE — 84703 CHORIONIC GONADOTROPIN ASSAY: CPT

## 2023-12-14 PROCEDURE — 74177 CT ABD & PELVIS W/CONTRAST: CPT

## 2023-12-14 PROCEDURE — 87088 URINE BACTERIA CULTURE: CPT

## 2023-12-14 PROCEDURE — 83735 ASSAY OF MAGNESIUM: CPT

## 2023-12-14 PROCEDURE — 6370000000 HC RX 637 (ALT 250 FOR IP): Performed by: STUDENT IN AN ORGANIZED HEALTH CARE EDUCATION/TRAINING PROGRAM

## 2023-12-14 RX ORDER — PROMETHAZINE HYDROCHLORIDE 25 MG/ML
12.5 INJECTION, SOLUTION INTRAMUSCULAR; INTRAVENOUS ONCE
Status: DISCONTINUED | OUTPATIENT
Start: 2023-12-14 | End: 2023-12-14

## 2023-12-14 RX ORDER — BUSPIRONE HYDROCHLORIDE 30 MG/1
TABLET ORAL
COMMUNITY
Start: 2023-11-30

## 2023-12-14 RX ORDER — QUETIAPINE FUMARATE 100 MG/1
TABLET, FILM COATED ORAL
COMMUNITY
Start: 2023-11-30

## 2023-12-14 RX ORDER — ONDANSETRON 2 MG/ML
4 INJECTION INTRAMUSCULAR; INTRAVENOUS ONCE
Status: COMPLETED | OUTPATIENT
Start: 2023-12-14 | End: 2023-12-14

## 2023-12-14 RX ORDER — GABAPENTIN 300 MG/1
1 CAPSULE ORAL 2 TIMES DAILY
COMMUNITY
Start: 2022-03-21

## 2023-12-14 RX ORDER — PALIPERIDONE 9 MG/1
TABLET, EXTENDED RELEASE ORAL
COMMUNITY
Start: 2023-12-01

## 2023-12-14 RX ORDER — 0.9 % SODIUM CHLORIDE 0.9 %
500 INTRAVENOUS SOLUTION INTRAVENOUS ONCE
Status: COMPLETED | OUTPATIENT
Start: 2023-12-14 | End: 2023-12-14

## 2023-12-14 RX ORDER — MORPHINE SULFATE 4 MG/ML
4 INJECTION, SOLUTION INTRAMUSCULAR; INTRAVENOUS ONCE
Status: COMPLETED | OUTPATIENT
Start: 2023-12-14 | End: 2023-12-14

## 2023-12-14 RX ORDER — POTASSIUM CHLORIDE 750 MG/1
40 TABLET, EXTENDED RELEASE ORAL ONCE
Status: COMPLETED | OUTPATIENT
Start: 2023-12-14 | End: 2023-12-14

## 2023-12-14 RX ORDER — KETOROLAC TROMETHAMINE 15 MG/ML
15 INJECTION, SOLUTION INTRAMUSCULAR; INTRAVENOUS ONCE
Status: COMPLETED | OUTPATIENT
Start: 2023-12-14 | End: 2023-12-14

## 2023-12-14 RX ORDER — BUPRENORPHINE 300 MG/1
SOLUTION SUBCUTANEOUS
COMMUNITY
Start: 2023-12-13

## 2023-12-14 RX ORDER — METOCLOPRAMIDE HYDROCHLORIDE 5 MG/ML
10 INJECTION INTRAMUSCULAR; INTRAVENOUS ONCE
Status: COMPLETED | OUTPATIENT
Start: 2023-12-14 | End: 2023-12-14

## 2023-12-14 RX ADMIN — KETOROLAC TROMETHAMINE 15 MG: 15 INJECTION, SOLUTION INTRAMUSCULAR; INTRAVENOUS at 19:50

## 2023-12-14 RX ADMIN — METOCLOPRAMIDE 10 MG: 5 INJECTION, SOLUTION INTRAMUSCULAR; INTRAVENOUS at 20:37

## 2023-12-14 RX ADMIN — CEFTRIAXONE SODIUM 1000 MG: 1 INJECTION, POWDER, FOR SOLUTION INTRAMUSCULAR; INTRAVENOUS at 19:50

## 2023-12-14 RX ADMIN — SODIUM CHLORIDE 500 ML: 9 INJECTION, SOLUTION INTRAVENOUS at 20:17

## 2023-12-14 RX ADMIN — ONDANSETRON 4 MG: 2 INJECTION INTRAMUSCULAR; INTRAVENOUS at 19:50

## 2023-12-14 RX ADMIN — POTASSIUM CHLORIDE 40 MEQ: 750 TABLET, EXTENDED RELEASE ORAL at 20:16

## 2023-12-14 RX ADMIN — IOMEPROL INJECTION 75 ML: 714 INJECTION, SOLUTION INTRAVASCULAR at 20:01

## 2023-12-14 RX ADMIN — HYDROMORPHONE HYDROCHLORIDE 0.5 MG: 1 INJECTION, SOLUTION INTRAMUSCULAR; INTRAVENOUS; SUBCUTANEOUS at 22:46

## 2023-12-14 RX ADMIN — MORPHINE SULFATE 4 MG: 4 INJECTION INTRAVENOUS at 20:38

## 2023-12-14 ASSESSMENT — PAIN SCALES - GENERAL
PAINLEVEL_OUTOF10: 10
PAINLEVEL_OUTOF10: 8
PAINLEVEL_OUTOF10: 7

## 2023-12-14 ASSESSMENT — PAIN DESCRIPTION - DESCRIPTORS
DESCRIPTORS: SHARP
DESCRIPTORS: SHARP

## 2023-12-14 ASSESSMENT — PAIN DESCRIPTION - ORIENTATION
ORIENTATION: LEFT
ORIENTATION: LEFT

## 2023-12-14 ASSESSMENT — PAIN DESCRIPTION - LOCATION
LOCATION: ABDOMEN
LOCATION: FLANK
LOCATION: ABDOMEN

## 2023-12-14 ASSESSMENT — PAIN DESCRIPTION - PAIN TYPE: TYPE: ACUTE PAIN

## 2023-12-14 ASSESSMENT — PAIN - FUNCTIONAL ASSESSMENT
PAIN_FUNCTIONAL_ASSESSMENT: 0-10
PAIN_FUNCTIONAL_ASSESSMENT: PREVENTS OR INTERFERES SOME ACTIVE ACTIVITIES AND ADLS

## 2023-12-15 ENCOUNTER — ANESTHESIA (OUTPATIENT)
Dept: OPERATING ROOM | Age: 38
End: 2023-12-15
Payer: MEDICAID

## 2023-12-15 ENCOUNTER — ANESTHESIA EVENT (OUTPATIENT)
Dept: OPERATING ROOM | Age: 38
End: 2023-12-15
Payer: MEDICAID

## 2023-12-15 VITALS
OXYGEN SATURATION: 95 % | TEMPERATURE: 98.6 F | DIASTOLIC BLOOD PRESSURE: 60 MMHG | WEIGHT: 129.19 LBS | HEART RATE: 76 BPM | BODY MASS INDEX: 26.09 KG/M2 | SYSTOLIC BLOOD PRESSURE: 94 MMHG | RESPIRATION RATE: 16 BRPM

## 2023-12-15 PROBLEM — N12 PYELONEPHRITIS: Status: ACTIVE | Noted: 2023-12-15

## 2023-12-15 PROCEDURE — 2580000003 HC RX 258: Performed by: NURSE ANESTHETIST, CERTIFIED REGISTERED

## 2023-12-15 PROCEDURE — 2580000003 HC RX 258: Performed by: STUDENT IN AN ORGANIZED HEALTH CARE EDUCATION/TRAINING PROGRAM

## 2023-12-15 PROCEDURE — 2500000003 HC RX 250 WO HCPCS: Performed by: NURSE ANESTHETIST, CERTIFIED REGISTERED

## 2023-12-15 PROCEDURE — 6360000002 HC RX W HCPCS: Performed by: NURSE ANESTHETIST, CERTIFIED REGISTERED

## 2023-12-15 RX ORDER — PROPOFOL 10 MG/ML
INJECTION, EMULSION INTRAVENOUS PRN
Status: DISCONTINUED | OUTPATIENT
Start: 2023-12-15 | End: 2023-12-15 | Stop reason: SDUPTHER

## 2023-12-15 RX ORDER — PHENYLEPHRINE HCL IN 0.9% NACL 1 MG/10 ML
SYRINGE (ML) INTRAVENOUS PRN
Status: DISCONTINUED | OUTPATIENT
Start: 2023-12-15 | End: 2023-12-15 | Stop reason: SDUPTHER

## 2023-12-15 RX ORDER — LIDOCAINE HYDROCHLORIDE 20 MG/ML
INJECTION, SOLUTION INFILTRATION; PERINEURAL PRN
Status: DISCONTINUED | OUTPATIENT
Start: 2023-12-15 | End: 2023-12-15 | Stop reason: SDUPTHER

## 2023-12-15 RX ORDER — GLYCOPYRROLATE 0.2 MG/ML
INJECTION INTRAMUSCULAR; INTRAVENOUS PRN
Status: DISCONTINUED | OUTPATIENT
Start: 2023-12-15 | End: 2023-12-15 | Stop reason: SDUPTHER

## 2023-12-15 RX ORDER — DEXAMETHASONE SODIUM PHOSPHATE 4 MG/ML
INJECTION, SOLUTION INTRA-ARTICULAR; INTRALESIONAL; INTRAMUSCULAR; INTRAVENOUS; SOFT TISSUE PRN
Status: DISCONTINUED | OUTPATIENT
Start: 2023-12-15 | End: 2023-12-15 | Stop reason: SDUPTHER

## 2023-12-15 RX ORDER — ONDANSETRON 2 MG/ML
INJECTION INTRAMUSCULAR; INTRAVENOUS PRN
Status: DISCONTINUED | OUTPATIENT
Start: 2023-12-15 | End: 2023-12-15 | Stop reason: SDUPTHER

## 2023-12-15 RX ORDER — FENTANYL CITRATE 50 UG/ML
INJECTION, SOLUTION INTRAMUSCULAR; INTRAVENOUS PRN
Status: DISCONTINUED | OUTPATIENT
Start: 2023-12-15 | End: 2023-12-15 | Stop reason: SDUPTHER

## 2023-12-15 RX ORDER — SODIUM CHLORIDE 9 MG/ML
INJECTION, SOLUTION INTRAVENOUS CONTINUOUS PRN
Status: DISCONTINUED | OUTPATIENT
Start: 2023-12-15 | End: 2023-12-15 | Stop reason: SDUPTHER

## 2023-12-15 RX ORDER — 0.9 % SODIUM CHLORIDE 0.9 %
1000 INTRAVENOUS SOLUTION INTRAVENOUS ONCE
Status: COMPLETED | OUTPATIENT
Start: 2023-12-15 | End: 2023-12-15

## 2023-12-15 RX ADMIN — FENTANYL CITRATE 50 MCG: 50 INJECTION, SOLUTION INTRAMUSCULAR; INTRAVENOUS at 16:33

## 2023-12-15 RX ADMIN — GLYCOPYRROLATE 0.2 MG: 0.2 INJECTION, SOLUTION INTRAMUSCULAR; INTRAVENOUS at 16:34

## 2023-12-15 RX ADMIN — SODIUM CHLORIDE 1000 ML: 9 INJECTION, SOLUTION INTRAVENOUS at 02:26

## 2023-12-15 RX ADMIN — DEXAMETHASONE SODIUM PHOSPHATE 10 MG: 4 INJECTION, SOLUTION INTRAMUSCULAR; INTRAVENOUS at 16:30

## 2023-12-15 RX ADMIN — FENTANYL CITRATE 50 MCG: 50 INJECTION, SOLUTION INTRAMUSCULAR; INTRAVENOUS at 16:24

## 2023-12-15 RX ADMIN — Medication 50 MCG: at 16:32

## 2023-12-15 RX ADMIN — Medication 100 MCG: at 16:37

## 2023-12-15 RX ADMIN — ONDANSETRON 4 MG: 2 INJECTION INTRAMUSCULAR; INTRAVENOUS at 16:30

## 2023-12-15 RX ADMIN — PROPOFOL 50 MG: 10 INJECTION, EMULSION INTRAVENOUS at 16:28

## 2023-12-15 RX ADMIN — Medication 100 MCG: at 16:40

## 2023-12-15 RX ADMIN — GLYCOPYRROLATE 0.2 MG: 0.2 INJECTION, SOLUTION INTRAMUSCULAR; INTRAVENOUS at 16:30

## 2023-12-15 RX ADMIN — LIDOCAINE HYDROCHLORIDE 60 MG: 20 INJECTION, SOLUTION INFILTRATION; PERINEURAL at 16:24

## 2023-12-15 RX ADMIN — PROPOFOL 150 MG: 10 INJECTION, EMULSION INTRAVENOUS at 16:24

## 2023-12-15 RX ADMIN — SODIUM CHLORIDE: 9 INJECTION, SOLUTION INTRAVENOUS at 16:21

## 2023-12-15 NOTE — ED NOTES
Pt ambulated to restroom with steady gait at this time. After returning pt assisted back in bed and placed back on monitor. Pt denies any other needs at this time. Lights dimmed for comfort.      Caridad Pop RN  12/15/23 [id number]

## 2023-12-15 NOTE — ED NOTES
Started new bag of IV fluids at this time. Notified pt that EMS should be here any minute to take her to [facility].      Mayur Finney RN  12/15/23 2478

## 2023-12-15 NOTE — ANESTHESIA POSTPROCEDURE EVALUATION
Department of Anesthesiology  Postprocedure Note    Patient: Beatris Steven  MRN: [id number]  YOB: 1985  Date of evaluation: 12/15/2023    Procedure Summary       Date: 12/15/23 Room / Location: [address] [place] [address]    Anesthesia Start: 2538 Anesthesia Stop: 1648    Procedure: CYSTOSCOPY LEFT URETERAL STENT INSERTION (Left: Bladder) Diagnosis:       Pyelonephritis      (Pyelonephritis [N12])    Surgeons: Allan Tubbs MD Responsible Provider: Brit Vasquez MD    Anesthesia Type: general ASA Status: 3            Anesthesia Type: No value filed. Haley Phase I: Haley Score: 10    Haley Phase II:      Anesthesia Post Evaluation    Patient location during evaluation: PACU  Patient participation: complete - patient participated  Level of consciousness: awake and alert  Airway patency: patent  Nausea & Vomiting: no nausea and no vomiting  Cardiovascular status: blood pressure returned to baseline  Respiratory status: acceptable  Hydration status: euvolemic  Comments: VSS on transfer to phase 2 recovery. No anesthetic complications. Pain management: adequate    There were no known notable events for this encounter.

## 2023-12-15 NOTE — ED NOTES
Pt sleeping at this time. Pt laying on left side. Skin pwd, breathing easy and unlabored.      Maritza Foster RN  12/15/23 8824

## 2023-12-15 NOTE — ED NOTES
Pt resting in bed speaking with family, pt states she has left flank pain. Pt updated on plan of care.       Prince Clifton RN  12/14/23 1553

## 2023-12-17 PROBLEM — G47.33 OBSTRUCTIVE SLEEP APNEA SYNDROME: Status: ACTIVE | Noted: 2023-12-17

## 2023-12-17 PROBLEM — R00.1 SINUS BRADYCARDIA: Status: ACTIVE | Noted: 2023-12-17

## 2023-12-17 LAB
BACTERIA UR CULT: ABNORMAL
ORGANISM: ABNORMAL

## 2023-12-17 NOTE — RESULT ENCOUNTER NOTE
Culture reviewed, urine culture was positive for E. coli. It does not appear even after transfer that she was started on any antibiotic to go home. If this is the case, I would start her on Omnicef 300 mg twice daily for 8 days. Please call this prescription if needed. Please call the patient to inform her of the result and see if she was sent home on any antibiotic.

## (undated) DEVICE — ZAMI/KRONNER 4.5, UTERINE MANIPULATOR/INJECTOR, 12/BX: Brand: MARINA MEDICAL

## (undated) DEVICE — ROBOTIC GYN: Brand: MEDLINE INDUSTRIES, INC.

## (undated) DEVICE — SKIN AFFIX SURG ADHESIVE 72/CS 0.55ML: Brand: MEDLINE

## (undated) DEVICE — TRANSVAGINAL MID-URETHRAL SLING
Type: IMPLANTABLE DEVICE | Site: VAGINA | Status: NON-FUNCTIONAL
Brand: ADVANTAGE FIT™  SYSTEM
Removed: 2022-02-02

## (undated) DEVICE — GLOVE ORANGE PI 8   MSG9080

## (undated) DEVICE — SYRINGE 20ML LL S/C 50

## (undated) DEVICE — TOTAL TRAY, 16FR 10ML SIL FOLEY, URN: Brand: MEDLINE

## (undated) DEVICE — SCOPE DAVINCI XI 30 DEG W/CORD

## (undated) DEVICE — SUTURE ETHBND EXCEL SZ 2-0 L36IN NONABSORBABLE GRN L26MM SH X523H

## (undated) DEVICE — CAMERA STRYKER 1488 HD GEN

## (undated) DEVICE — YANKAUER,BULB TIP,W/O VENT,RIGID,STERILE: Brand: MEDLINE

## (undated) DEVICE — CIRCON 30/70 URO LENS

## (undated) DEVICE — SUCTION IRRIGATOR: Brand: ENDOWRIST

## (undated) DEVICE — SOLUTION IV IRRIG POUR BRL 0.9% SODIUM CHL 2F7124

## (undated) DEVICE — TUBING, SUCTION, 1/4" X 10', STRAIGHT: Brand: MEDLINE

## (undated) DEVICE — INSUFFLATION NEEDLE TO ESTABLISH PNEUMOPERITONEUM.: Brand: INSUFFLATION NEEDLE

## (undated) DEVICE — TRAY,VAG PREP,2PR VNYL GLV,4 C: Brand: MEDLINE INDUSTRIES, INC.

## (undated) DEVICE — TRAY CATH 16FR F INCLUDE LUB DRNGE BG STATLOK STBL DEV

## (undated) DEVICE — Y-TYPE TUR/BLADDER IRRIGATION SET, REGULATING CLAMP

## (undated) DEVICE — GAUZE,PACKING STRIP,IODOFORM,1"X5YD,STRL: Brand: CURAD

## (undated) DEVICE — JELLY LUBRICATING 2.7GM H2O SOL GREASELESS E-Z

## (undated) DEVICE — OPEN-END FLEXI-TIP URETERAL CATHETER: Brand: FLEXI-TIP

## (undated) DEVICE — PAD SANITARY MTRN TAB BELT WRP NS 11IN

## (undated) DEVICE — SYRINGE,TOOMEY,IRRIGATION,70CC,STERILE: Brand: MEDLINE

## (undated) DEVICE — CATHETER URETH 18FR BLLN 5CC SIL HYDRGEL 3 W F INDWL INF

## (undated) DEVICE — CANNULA SEAL

## (undated) DEVICE — DESALVO SET

## (undated) DEVICE — Z INACTIVE SYRINGE BLB IRR

## (undated) DEVICE — 3M™ TEGADERM™ TRANSPARENT FILM DRESSING FRAME STYLE, 1624W, 2-3/8 IN X 2-3/4 IN (6 CM X 7 CM), 100/CT 4CT/CASE: Brand: 3M™ TEGADERM™

## (undated) DEVICE — BLADELESS OBTURATOR: Brand: WECK VISTA

## (undated) DEVICE — TRAY PROCED DILATATION CURETTAGE

## (undated) DEVICE — SUTURE VCRL + SZ 2-0 L27IN ABSRB WHT SH 1/2 CIR TAPERCUT VCP417H

## (undated) DEVICE — SYRINGE MED 50ML LUERLOCK TIP

## (undated) DEVICE — 3M™ STERI-STRIP™ COMPOUND BENZOIN TINCTURE 40 BAGS/CARTON 4 CARTONS/CASE C1544: Brand: 3M™ STERI-STRIP™

## (undated) DEVICE — SOLUTION IRRIGATION STRL H2O 1000 ML UROMATIC CONTAINER

## (undated) DEVICE — SOLUTION IV IRRIG 500ML 0.9% SODIUM CHL 2F7123

## (undated) DEVICE — SET INST DAVINCI XI ACCESSORIES

## (undated) DEVICE — GARMENT,MEDLINE,DVT,INT,CALF,MED, GEN2: Brand: MEDLINE

## (undated) DEVICE — GOWN,SURGICAL,AURORA,SLEEVE: Brand: MEDLINE

## (undated) DEVICE — COLUMN DRAPE

## (undated) DEVICE — TIP UTER MANIP 6.7 MMX10 CM RUMI ST

## (undated) DEVICE — GUIDEWIRE URO L150CM DIA0.035IN TAPR 8CM STR TIP STD SHFT

## (undated) DEVICE — SCOPE DAVINCI XI 0 DEG W/CORD

## (undated) DEVICE — SYRINGE MED 10ML LUERLOCK TIP W/O SFTY DISP

## (undated) DEVICE — KIT INCLUDES: GTB17, ALEXIS CONTAINED EXTRACTION SYSTEM;  CNGL2, GELPOINT ADVANCED ACCESS PLATFORM: Brand: ALEXIS® CONTAINED EXTRACTION SYSTEM WITH GELPOINT® ADVANCED ACCESS PLATFORM

## (undated) DEVICE — MICRO TIP WIPE: Brand: DEVON

## (undated) DEVICE — PAD MATERNITY CURITY ADH STRIP DISP

## (undated) DEVICE — WARMER SCP LAP

## (undated) DEVICE — DEVICE SECUREMENT AD W/ TRICOT ANCHR PD FOR F LTX SIL CATH

## (undated) DEVICE — CYSTOSCOPY: Brand: MEDLINE INDUSTRIES, INC.

## (undated) DEVICE — 40586 ADVANCED TRENDELENBURG POSITIONING KIT: Brand: 40586 ADVANCED TRENDELENBURG POSITIONING KIT

## (undated) DEVICE — SYRINGE IRRIG 60ML SFT PLIABLE BLB EZ TO GRP 1 HND USE W/

## (undated) DEVICE — TOWEL,OR,DSP,ST,BLUE,STD,6/PK,12PK/CS: Brand: MEDLINE

## (undated) DEVICE — MARYLAND BIPOLAR FORCEPS: Brand: ENDOWRIST;DAVINCI SI

## (undated) DEVICE — SYSTEM SMK EVAC LAP TBNG FILTER HSNG BENT STYL PNK SEE CLR

## (undated) DEVICE — ARM DRAPE

## (undated) DEVICE — MEGA SUTURECUT ND: Brand: ENDOWRIST

## (undated) DEVICE — COUNTER NDL 40 COUNT HLD 70 NUM FOAM BLK SGL MAG W BLDE REMV

## (undated) DEVICE — SOLUTION INJ ST H2O VIAFLX PLAS 1000ML CONT FOR DRUG DIL

## (undated) DEVICE — CURITY IDOFORM PACKING STRIP: Brand: CURITY

## (undated) DEVICE — [HIGH FLOW INSUFFLATOR,  DO NOT USE IF PACKAGE IS DAMAGED,  KEEP DRY,  KEEP AWAY FROM SUNLIGHT,  PROTECT FROM HEAT AND RADIOACTIVE SOURCES.]: Brand: PNEUMOSURE

## (undated) DEVICE — ELECTROSURGICAL PENCIL BUTTON SWITCH E-Z CLEAN COATED BLADE ELECTRODE 10 FT (3 M) CORD HOLSTER: Brand: MEGADYNE

## (undated) DEVICE — PLUMEPORT LAPAROSCOPIC SMOKE FILTRATION DEVICE: Brand: PLUMEPORT ACTIV

## (undated) DEVICE — SUTURE VCRL SZ 3-0 L18IN ABSRB UD L26MM SH 1/2 CIR J864D

## (undated) DEVICE — SET MAJOR INSTR HOUSE

## (undated) DEVICE — SUTURE VCRL + SZ 4-0 L18IN ABSRB UD L19MM PS-2 3/8 CIR PRIM VCP496H

## (undated) DEVICE — PACK,AURORA,LAVH: Brand: MEDLINE

## (undated) DEVICE — DOUBLE BASIN SET: Brand: MEDLINE INDUSTRIES, INC.

## (undated) DEVICE — GAUZE,SPONGE,4"X4",16PLY,XRAY,STRL,LF: Brand: MEDLINE

## (undated) DEVICE — Device

## (undated) DEVICE — STRIP,CLOSURE,WOUND,MEDI-STRIP,1/2X4: Brand: MEDLINE

## (undated) DEVICE — GOWN SIRUS NONREIN XL W/TWL: Brand: MEDLINE INDUSTRIES, INC.

## (undated) DEVICE — CHLORAPREP 26ML ORANGE

## (undated) DEVICE — PLUG,CATHETER,DRAINAGE PROTECTOR,TUBE: Brand: MEDLINE

## (undated) DEVICE — GLOVE SURG SZ 65 CRM LTX FREE POLYISOPRENE POLYMER BEAD ANTI

## (undated) DEVICE — SUTURE V-LOC 180 SZ 0 L12IN ABSRB GRN L37MM GS-21 1/2 CIR VLOCL0316

## (undated) DEVICE — ELECTRO LUBE IS A SINGLE PATIENT USE DEVICE THAT IS INTENDED TO BE USED ON ELECTROSURGICAL ELECTRODES TO REDUCE STICKING.: Brand: KEY SURGICAL ELECTRO LUBE

## (undated) DEVICE — NDL CNTR 40CT FM MAG: Brand: MEDLINE INDUSTRIES, INC.

## (undated) DEVICE — CATHETER F BLLN 5CC 18FR 3 W SPEC SHT RND TIP TWO OPP DRNGE

## (undated) DEVICE — DRAPE,REIN 53X77,STERILE: Brand: MEDLINE

## (undated) DEVICE — TIP COVER ACCESSORY

## (undated) DEVICE — SYSTEM ES CUP DIA3.5CM PNEUMO OCCL BLLN DISP FOR CLIN POS

## (undated) DEVICE — COVER,TABLE,60X90,STERILE: Brand: MEDLINE

## (undated) DEVICE — GAUZE,SPONGE,2"X2",8PLY,STERILE,LF,2'S: Brand: MEDLINE

## (undated) DEVICE — SUTURE VCRL SZ 2-0 L18IN ABSRB VLT L26MM SH 1/2 CIR J775D

## (undated) DEVICE — COVER,LIGHT HANDLE,FLX,1/PK: Brand: MEDLINE INDUSTRIES, INC.

## (undated) DEVICE — ELECTRODE PT RET AD L9FT HI MOIST COND ADH HYDRGEL CORDED

## (undated) DEVICE — PENCIL ES L3M BTTN SWCH S STL HEX LOK BLDE ELECTRD HOLSTER

## (undated) DEVICE — INTENDED FOR TISSUE SEPARATION, AND OTHER PROCEDURES THAT REQUIRE A SHARP SURGICAL BLADE TO PUNCTURE OR CUT.: Brand: BARD-PARKER ® STAINLESS STEEL BLADES

## (undated) DEVICE — TOWEL,OR,DSP,ST,BLUE,STD,4/PK,20PK/CS: Brand: MEDLINE

## (undated) DEVICE — SHEET DRAPE FULL 70X100

## (undated) DEVICE — MARKER,SKIN,WI/RULER AND LABELS: Brand: MEDLINE

## (undated) DEVICE — PACK PROC LAP II AURORA

## (undated) DEVICE — CIRCUIT ANES L72IN 3L BACT AND VIR FLTR EL CONN SGL LIMB

## (undated) DEVICE — SOLUTION IRRIG 1000ML STRL H2O USP PLAS POUR BTL

## (undated) DEVICE — TROCAR: Brand: KII SHIELDED BLADED ACCESS SYSTEM

## (undated) DEVICE — NEEDLE SPNL 22GA L5IN BLK HUB S STL W/ QNCKE PNT W/OUT

## (undated) DEVICE — SUTURE PERMAHAND SZ 2-0 L18IN NONABSORBABLE BLK L26MM SH C012D

## (undated) DEVICE — SET SURG INSTR DISSECT

## (undated) DEVICE — Device: Brand: INSTRUSAFE

## (undated) DEVICE — NEEDLE HYPO 25GA L1.5IN BLU POLYPR HUB S STL REG BVL STR

## (undated) DEVICE — MAJOR SET UP PK

## (undated) DEVICE — DRAINBAG,ANTI-REFLUX TOWER,L/F,2000ML,LL: Brand: MEDLINE

## (undated) DEVICE — PROGRASP FORCEPS: Brand: ENDOWRIST

## (undated) DEVICE — ANTI-FOG SOLUTION WITH FOAM PAD: Brand: DEVON

## (undated) DEVICE — MEDI-VAC NON-CONDUCTIVE SUCTION TUBING: Brand: CARDINAL HEALTH

## (undated) DEVICE — TENACULUM FORCEPS: Brand: ENDOWRIST

## (undated) DEVICE — PATIENT RETURN ELECTRODE, SINGLE-USE, CONTACT QUALITY MONITORING, ADULT, WITH 9FT CORD, FOR PATIENTS WEIGING OVER 33LBS. (15KG): Brand: MEGADYNE

## (undated) DEVICE — INTEGRA® JARIT® ALLIS FORCEPS, 6-1/4", TISSUE, 4X5 TEETH: Brand: INTEGRA® JARIT®

## (undated) DEVICE — PMI DISPOSABLE PUNCTURE CLOSURE DEVICE / SUTURE GRASPER: Brand: PMI

## (undated) DEVICE — SOLUTION IV 1000ML 0.9% SOD CHL PH 5 INJ USP VIAFLX PLAS

## (undated) DEVICE — CYSTO/BLADDER IRRIGATION SET, REGULATING CLAMP

## (undated) DEVICE — GOWN,SIRUS,NONRNF,SETINSLV,XL,20/CS: Brand: MEDLINE

## (undated) DEVICE — CIRCON 21F CYSTO TRAY

## (undated) DEVICE — SCISSORS SURG DIA8MM MPLR CRV ENDOWRIST

## (undated) DEVICE — TROCAR: Brand: KII FIOS FIRST ENTRY

## (undated) DEVICE — SYRINGE, LUER LOCK, 10ML: Brand: MEDLINE

## (undated) DEVICE — SOLUTION IRRIG 3000ML STRL H2O USP UROMATIC PLAS CONT